# Patient Record
Sex: MALE | Race: WHITE | URBAN - METROPOLITAN AREA
[De-identification: names, ages, dates, MRNs, and addresses within clinical notes are randomized per-mention and may not be internally consistent; named-entity substitution may affect disease eponyms.]

---

## 2018-03-23 RX ADMIN — FENTANYL CITRATE 50 MICROGRAM(S): 50 INJECTION INTRAVENOUS at 16:40

## 2018-04-20 ENCOUNTER — INPATIENT (INPATIENT)
Facility: HOSPITAL | Age: 63
LOS: 7 days | Discharge: HOME | DRG: 236 | End: 2018-04-28
Attending: THORACIC SURGERY (CARDIOTHORACIC VASCULAR SURGERY) | Admitting: THORACIC SURGERY (CARDIOTHORACIC VASCULAR SURGERY)
Payer: COMMERCIAL

## 2018-04-20 VITALS
HEIGHT: 68 IN | SYSTOLIC BLOOD PRESSURE: 166 MMHG | HEART RATE: 87 BPM | DIASTOLIC BLOOD PRESSURE: 72 MMHG | WEIGHT: 164.46 LBS | OXYGEN SATURATION: 97 % | RESPIRATION RATE: 18 BRPM

## 2018-04-20 DIAGNOSIS — I25.10 ATHEROSCLEROTIC HEART DISEASE OF NATIVE CORONARY ARTERY WITHOUT ANGINA PECTORIS: ICD-10-CM

## 2018-04-20 DIAGNOSIS — Z90.89 ACQUIRED ABSENCE OF OTHER ORGANS: Chronic | ICD-10-CM

## 2018-04-20 DIAGNOSIS — I10 ESSENTIAL (PRIMARY) HYPERTENSION: ICD-10-CM

## 2018-04-20 DIAGNOSIS — K21.9 GASTRO-ESOPHAGEAL REFLUX DISEASE WITHOUT ESOPHAGITIS: ICD-10-CM

## 2018-04-20 DIAGNOSIS — I45.10 UNSPECIFIED RIGHT BUNDLE-BRANCH BLOCK: ICD-10-CM

## 2018-04-20 DIAGNOSIS — Z98.890 OTHER SPECIFIED POSTPROCEDURAL STATES: Chronic | ICD-10-CM

## 2018-04-20 DIAGNOSIS — I21.4 NON-ST ELEVATION (NSTEMI) MYOCARDIAL INFARCTION: ICD-10-CM

## 2018-04-20 DIAGNOSIS — E78.5 HYPERLIPIDEMIA, UNSPECIFIED: ICD-10-CM

## 2018-04-20 DIAGNOSIS — Z79.82 LONG TERM (CURRENT) USE OF ASPIRIN: ICD-10-CM

## 2018-04-20 PROBLEM — Z00.00 ENCOUNTER FOR PREVENTIVE HEALTH EXAMINATION: Status: ACTIVE | Noted: 2018-04-20

## 2018-04-20 LAB
ALBUMIN SERPL ELPH-MCNC: 3.9 G/DL — SIGNIFICANT CHANGE UP (ref 3.3–5)
ALP SERPL-CCNC: 61 U/L — SIGNIFICANT CHANGE UP (ref 40–120)
ALT FLD-CCNC: 26 U/L — SIGNIFICANT CHANGE UP (ref 10–45)
ANION GAP SERPL CALC-SCNC: 12 MMOL/L — SIGNIFICANT CHANGE UP (ref 5–17)
AST SERPL-CCNC: 30 U/L — SIGNIFICANT CHANGE UP (ref 10–40)
BILIRUB SERPL-MCNC: 0.3 MG/DL — SIGNIFICANT CHANGE UP (ref 0.2–1.2)
BLD GP AB SCN SERPL QL: NEGATIVE — SIGNIFICANT CHANGE UP
BLD GP AB SCN SERPL QL: NEGATIVE — SIGNIFICANT CHANGE UP
BUN SERPL-MCNC: 18 MG/DL — SIGNIFICANT CHANGE UP (ref 7–23)
CALCIUM SERPL-MCNC: 8.9 MG/DL — SIGNIFICANT CHANGE UP (ref 8.4–10.5)
CHLORIDE SERPL-SCNC: 101 MMOL/L — SIGNIFICANT CHANGE UP (ref 96–108)
CHOLEST SERPL-MCNC: 119 MG/DL — SIGNIFICANT CHANGE UP (ref 10–199)
CK MB CFR SERPL CALC: 5.4 NG/ML — SIGNIFICANT CHANGE UP (ref 0–6.7)
CK SERPL-CCNC: 250 U/L — HIGH (ref 30–200)
CO2 SERPL-SCNC: 24 MMOL/L — SIGNIFICANT CHANGE UP (ref 22–31)
CREAT SERPL-MCNC: 1.16 MG/DL — SIGNIFICANT CHANGE UP (ref 0.5–1.3)
GLUCOSE SERPL-MCNC: 96 MG/DL — SIGNIFICANT CHANGE UP (ref 70–99)
HBA1C BLD-MCNC: 4.8 % — SIGNIFICANT CHANGE UP (ref 4–5.6)
HCT VFR BLD CALC: 37.3 % — LOW (ref 39–50)
HDLC SERPL-MCNC: 26 MG/DL — LOW (ref 40–125)
HGB BLD-MCNC: 12.1 G/DL — LOW (ref 13–17)
LIPID PNL WITH DIRECT LDL SERPL: 61 MG/DL — SIGNIFICANT CHANGE UP
MAGNESIUM SERPL-MCNC: 2 MG/DL — SIGNIFICANT CHANGE UP (ref 1.6–2.6)
MCHC RBC-ENTMCNC: 29.4 PG — SIGNIFICANT CHANGE UP (ref 27–34)
MCHC RBC-ENTMCNC: 32.4 G/DL — SIGNIFICANT CHANGE UP (ref 32–36)
MCV RBC AUTO: 90.8 FL — SIGNIFICANT CHANGE UP (ref 80–100)
NT-PROBNP SERPL-SCNC: 245 PG/ML — SIGNIFICANT CHANGE UP (ref 0–300)
PHOSPHATE SERPL-MCNC: 3.9 MG/DL — SIGNIFICANT CHANGE UP (ref 2.5–4.5)
PLATELET # BLD AUTO: 215 K/UL — SIGNIFICANT CHANGE UP (ref 150–400)
POTASSIUM SERPL-MCNC: 4 MMOL/L — SIGNIFICANT CHANGE UP (ref 3.5–5.3)
POTASSIUM SERPL-SCNC: 4 MMOL/L — SIGNIFICANT CHANGE UP (ref 3.5–5.3)
PROT SERPL-MCNC: 7.4 G/DL — SIGNIFICANT CHANGE UP (ref 6–8.3)
RBC # BLD: 4.11 M/UL — LOW (ref 4.2–5.8)
RBC # FLD: 12.6 % — SIGNIFICANT CHANGE UP (ref 10.3–16.9)
RH IG SCN BLD-IMP: POSITIVE — SIGNIFICANT CHANGE UP
RH IG SCN BLD-IMP: POSITIVE — SIGNIFICANT CHANGE UP
SODIUM SERPL-SCNC: 137 MMOL/L — SIGNIFICANT CHANGE UP (ref 135–145)
TOTAL CHOLESTEROL/HDL RATIO MEASUREMENT: 4.6 RATIO — SIGNIFICANT CHANGE UP (ref 3.4–9.6)
TRIGL SERPL-MCNC: 159 MG/DL — HIGH (ref 10–149)
TROPONIN T SERPL-MCNC: 0.04 NG/ML — HIGH (ref 0–0.01)
WBC # BLD: 5.1 K/UL — SIGNIFICANT CHANGE UP (ref 3.8–10.5)
WBC # FLD AUTO: 5.1 K/UL — SIGNIFICANT CHANGE UP (ref 3.8–10.5)

## 2018-04-20 PROCEDURE — 99223 1ST HOSP IP/OBS HIGH 75: CPT

## 2018-04-20 PROCEDURE — 71045 X-RAY EXAM CHEST 1 VIEW: CPT | Mod: 26

## 2018-04-20 RX ORDER — SODIUM CHLORIDE 9 MG/ML
3 INJECTION INTRAMUSCULAR; INTRAVENOUS; SUBCUTANEOUS EVERY 8 HOURS
Qty: 0 | Refills: 0 | Status: DISCONTINUED | OUTPATIENT
Start: 2018-04-20 | End: 2018-04-23

## 2018-04-20 RX ORDER — ASPIRIN/CALCIUM CARB/MAGNESIUM 324 MG
81 TABLET ORAL DAILY
Qty: 0 | Refills: 0 | Status: DISCONTINUED | OUTPATIENT
Start: 2018-04-20 | End: 2018-04-23

## 2018-04-20 RX ORDER — PANTOPRAZOLE SODIUM 20 MG/1
40 TABLET, DELAYED RELEASE ORAL
Qty: 0 | Refills: 0 | Status: DISCONTINUED | OUTPATIENT
Start: 2018-04-20 | End: 2018-04-23

## 2018-04-20 RX ORDER — MULTIVIT-MIN/FERROUS GLUCONATE 9 MG/15 ML
1 LIQUID (ML) ORAL
Qty: 0 | Refills: 0 | COMMUNITY

## 2018-04-20 RX ORDER — METOPROLOL TARTRATE 50 MG
12.5 TABLET ORAL EVERY 12 HOURS
Qty: 0 | Refills: 0 | Status: DISCONTINUED | OUTPATIENT
Start: 2018-04-20 | End: 2018-04-22

## 2018-04-20 RX ORDER — ISOSORBIDE MONONITRATE 60 MG/1
30 TABLET, EXTENDED RELEASE ORAL DAILY
Qty: 0 | Refills: 0 | Status: DISCONTINUED | OUTPATIENT
Start: 2018-04-20 | End: 2018-04-23

## 2018-04-20 RX ORDER — ATORVASTATIN CALCIUM 80 MG/1
40 TABLET, FILM COATED ORAL AT BEDTIME
Qty: 0 | Refills: 0 | Status: DISCONTINUED | OUTPATIENT
Start: 2018-04-20 | End: 2018-04-23

## 2018-04-20 RX ORDER — HEPARIN SODIUM 5000 [USP'U]/ML
5000 INJECTION INTRAVENOUS; SUBCUTANEOUS EVERY 8 HOURS
Qty: 0 | Refills: 0 | Status: DISCONTINUED | OUTPATIENT
Start: 2018-04-20 | End: 2018-04-23

## 2018-04-20 RX ADMIN — SODIUM CHLORIDE 3 MILLILITER(S): 9 INJECTION INTRAMUSCULAR; INTRAVENOUS; SUBCUTANEOUS at 21:12

## 2018-04-20 NOTE — H&P ADULT - NSHPPHYSICALEXAM_GEN_ALL_CORE
Physical Exam  CONSTITUTIONAL: Lying in bed, comfortable, NAD  NEURO: A&O x 3, b/l upper and lower extremity strength 5/5.  Facial expressions symmetrical no focal deficits                 EYES: PERRL, b/l sclera anicteric   ENMT: oral mucosa moist   CV:  S1S2 RRR No M/G/R  RESPIRATORY:  CTA b/l No W/R/R    GI: soft NT/ND      : BUTT ( - )  MUSKULOSKELETAL: No muscular wasting      SKIN / BREAST:  No scars or rashes  VASCULAR: DP/PT pulses 2 + b/l.  Left radial pulse 2 + right radial cath site without obvious hematoma.

## 2018-04-20 NOTE — H&P ADULT - PSH
H/O colonoscopy    History of esophagogastroduodenoscopy (EGD)    S/P tonsillectomy and adenoidectomy

## 2018-04-20 NOTE — H&P ADULT - NSHPSOCIALHISTORY_GEN_ALL_CORE
Social History  Smoker:    NO        ETOH Use:   YES   Frequency / Quantity: 4 beers per night  Ilicit Drug Use:   NO  Occupation: Construction  Assistant Devices:   None  Lives with: Roommates, has multiple stairs at home

## 2018-04-20 NOTE — H&P ADULT - ASSESSMENT
61y/o male w PmHx HT, GERD, HLD, RBBB,  presented to [facility] [place] 4/19 on referral of PCP for abnormal EKG w recent h/o typical CP and elevated troponin level.  He was admitted to telemetry service r/o ACS.  Cardiac cath showed significant left main disease (85% stenosis). Pt was transferred to [facility] 4/20 for possible CABG.  On transfer patient is pain free and without acute complaints.    Neurovascular: no acute issues     Cardiovascular: Hemodynamically stable.   -monitor BP/HR/Telemetry monitoring  - EKG, TTE (complete)   - continue home medications including   - no BB 2/2 bradycardia  -Pre-op for possible CABG    Respiratory:   -Encourage C+DB and Use of IS 10x / hr while awake.  -Pre-op PFTs    GI: Stable.   -PPX with protonix   -PO Diet for now (DASH)    Renal / : monitor for baseline labs   -Monitor renal function.  -Monitor I/O's.      Endocrine:  monitor for baseline labs   -A1c.  -TSH.    Hematologic: monitor for baseline labs   -CBC.  -Coagulation Panel.    ID: Intact  Prophylaxis:  -DVT prophylaxis with 5000 SubQ Heparin q8h.  -SCD's 63y/o male w PmHx HT, GERD, HLD, RBBB,  presented to [facility] [place] 4/19 on referral of PCP for abnormal EKG w recent h/o typical CP and elevated troponin level.  He was admitted to telemetry service r/o ACS.  Cardiac cath showed significant left main disease (85% stenosis). Pt was transferred to [facility] 4/20 for possible CABG.  On transfer patient is pain free and without acute complaints.    Neurovascular: no acute issues     Cardiovascular: Hemodynamically stable.   -monitor BP/HR/Telemetry monitoring  - EKG, TTE (complete)   - continue home medications including   -Pre-op for possible CABG    Respiratory:   -Encourage C+DB and Use of IS 10x / hr while awake.  -Pre-op PFTs    GI: Stable.   -PPX with protonix   -PO Diet for now (DASH)    Renal / : monitor for baseline labs   -Monitor renal function.  -Monitor I/O's.      Endocrine:  monitor for baseline labs   -A1c.  -TSH.    Hematologic: monitor for baseline labs   -CBC.  -Coagulation Panel.    ID: Intact  Prophylaxis:  -DVT prophylaxis with 5000 SubQ Heparin q8h.  -SCD's

## 2018-04-20 NOTE — H&P ADULT - NSHPREVIEWOFSYSTEMS_GEN_ALL_CORE
Review of Systems  CONSTITUTIONAL:  Denies Fevers / chills, sweats, fatigue, weight loss, weight gain                                      NEURO:  Denies parathesias, seizures, syncope, confusion                                                                                EYES:  Denies Blurry vision, discharge, pain, loss of vision                                                                                    ENMT:  Denies Difficulty hearing, vertigo, dysphagia, epistaxis, recent dental work                                       CV:  Denies Chest pain, palpitations, BASS, orthopnea                                                                                          RESPIRATORY:  Denies Wheezing, SOB, cough / sputum, hemoptysis                                                                GI:  Denies Nausea, vommiting, diarrhea, constipation, melena, difficulty swallowing                                               : Denies Hematuria, dysuria, urgency, incontinence                                                                                         MUSKULOSKELETAL:  Denies arthritis, joint swelling, muscle weakness                                                             SKIN/BREAST:  Denies rash, itching, carlos loss, masses                                                                                            PSYCH:  Denies depresion, anxiety, suicidal ideation                                                                                               HEME/LYMPH:  Denies bruises easily, enlarged lymph nodes, tender lymph nodes                                        ENDOCRINE:  Denies cold intolerance, heat intolerance, polydipsia Review of Systems  CONSTITUTIONAL:  Denies Fevers / chills, sweats, fatigue, weight loss, weight gain                                      NEURO:  Denies parathesias, seizures, syncope, confusion                                                                                EYES:  Denies Blurry vision, discharge, pain, loss of vision                                                                                    ENMT:  Denies Difficulty hearing, vertigo, dysphagia, epistaxis, recent dental work                                       CV:  + chest pain Denies palpitations, BASS, orthopnea                                                                                        RESPIRATORY:  Denies Wheezing, SOB, cough / sputum, hemoptysis                                                                GI:  Denies Nausea, vommiting, diarrhea, constipation, melena, difficulty swallowing                                               : Denies Hematuria, dysuria, urgency, incontinence                                                                                         MUSKULOSKELETAL:  Denies arthritis, joint swelling, muscle weakness                                                             SKIN/BREAST:  Denies rash, itching, carlos loss, masses                                                                                            PSYCH:  Denies depresion, anxiety, suicidal ideation                                                                                               HEME/LYMPH:  Denies bruises easily, enlarged lymph nodes, tender lymph nodes                                        ENDOCRINE:  Denies cold intolerance, heat intolerance, polydipsia

## 2018-04-20 NOTE — H&P ADULT - HISTORY OF PRESENT ILLNESS
63y/o male w PmHx HT, GERD, HLD, RBBB,  [place] [facility] [place] referral of PCP for abnormal EKG w recent h/o typical CP and elevated troponin level.  He was admitted to telemetry service r/o ACS.  Cardiac cath showed significant left main disease (85% stenosis). Pt was transferred to [facility] 4/20 for possible CABG.  On transfer patient is pain free and without acute complaints. This is a 61y/o male w PmHx HT, GERD, HLD, RBBB,  presented to [facility] [place] 4/19 on referral of PCP for abnormal EKG w recent h/o chest pain.  Per patient he had on and off substernal chest pain while he was at work.  He took no medications to relieve his pain and went to his PCP the next day.  In PCP office he had an abnormal EKG and was subsequently reffered to [facility] [place].  In the ED patient had an elevated troponin and was admitted to telemetry unit to r/o ACS.  Cardiac cath performed revealing significant left main disease with 85% occlusion.  Patient denies any occurrence of chest pain since Tuesday.  He is seen and examined at the bedside and denies dizziness, near syncope, HA, CP, SOB, palpitations, N/V/C/D, leg swelling or calf pain.

## 2018-04-21 LAB
APPEARANCE UR: CLEAR — SIGNIFICANT CHANGE UP
BACTERIA # UR AUTO: PRESENT /HPF
BILIRUB UR-MCNC: NEGATIVE — SIGNIFICANT CHANGE UP
COLOR SPEC: YELLOW — SIGNIFICANT CHANGE UP
DIFF PNL FLD: (no result)
EPI CELLS # UR: SIGNIFICANT CHANGE UP /HPF (ref 0–5)
GLUCOSE UR QL: NEGATIVE — SIGNIFICANT CHANGE UP
KETONES UR-MCNC: NEGATIVE — SIGNIFICANT CHANGE UP
LEUKOCYTE ESTERASE UR-ACNC: NEGATIVE — SIGNIFICANT CHANGE UP
NITRITE UR-MCNC: NEGATIVE — SIGNIFICANT CHANGE UP
PH UR: 5.5 — SIGNIFICANT CHANGE UP (ref 5–8)
PROT UR-MCNC: NEGATIVE MG/DL — SIGNIFICANT CHANGE UP
RBC CASTS # UR COMP ASSIST: < 5 /HPF — SIGNIFICANT CHANGE UP
SP GR SPEC: 1.01 — SIGNIFICANT CHANGE UP (ref 1–1.03)
TSH SERPL-MCNC: 3.82 UIU/ML — SIGNIFICANT CHANGE UP (ref 0.35–4.94)
UROBILINOGEN FLD QL: 0.2 E.U./DL — SIGNIFICANT CHANGE UP
WBC UR QL: < 5 /HPF — SIGNIFICANT CHANGE UP

## 2018-04-21 PROCEDURE — 93306 TTE W/DOPPLER COMPLETE: CPT | Mod: 26

## 2018-04-21 PROCEDURE — 71046 X-RAY EXAM CHEST 2 VIEWS: CPT | Mod: 26

## 2018-04-21 PROCEDURE — 93880 EXTRACRANIAL BILAT STUDY: CPT | Mod: 26

## 2018-04-21 PROCEDURE — 94010 BREATHING CAPACITY TEST: CPT | Mod: 26

## 2018-04-21 RX ADMIN — HEPARIN SODIUM 5000 UNIT(S): 5000 INJECTION INTRAVENOUS; SUBCUTANEOUS at 06:12

## 2018-04-21 RX ADMIN — Medication 12.5 MILLIGRAM(S): at 00:35

## 2018-04-21 RX ADMIN — SODIUM CHLORIDE 3 MILLILITER(S): 9 INJECTION INTRAMUSCULAR; INTRAVENOUS; SUBCUTANEOUS at 06:07

## 2018-04-21 RX ADMIN — HEPARIN SODIUM 5000 UNIT(S): 5000 INJECTION INTRAVENOUS; SUBCUTANEOUS at 15:53

## 2018-04-21 RX ADMIN — ATORVASTATIN CALCIUM 40 MILLIGRAM(S): 80 TABLET, FILM COATED ORAL at 22:02

## 2018-04-21 RX ADMIN — SODIUM CHLORIDE 3 MILLILITER(S): 9 INJECTION INTRAMUSCULAR; INTRAVENOUS; SUBCUTANEOUS at 15:53

## 2018-04-21 RX ADMIN — HEPARIN SODIUM 5000 UNIT(S): 5000 INJECTION INTRAVENOUS; SUBCUTANEOUS at 22:02

## 2018-04-21 RX ADMIN — Medication 12.5 MILLIGRAM(S): at 18:05

## 2018-04-21 RX ADMIN — HEPARIN SODIUM 5000 UNIT(S): 5000 INJECTION INTRAVENOUS; SUBCUTANEOUS at 00:35

## 2018-04-21 RX ADMIN — Medication 81 MILLIGRAM(S): at 14:08

## 2018-04-21 RX ADMIN — ATORVASTATIN CALCIUM 40 MILLIGRAM(S): 80 TABLET, FILM COATED ORAL at 00:35

## 2018-04-21 RX ADMIN — ISOSORBIDE MONONITRATE 30 MILLIGRAM(S): 60 TABLET, EXTENDED RELEASE ORAL at 14:08

## 2018-04-21 RX ADMIN — SODIUM CHLORIDE 3 MILLILITER(S): 9 INJECTION INTRAMUSCULAR; INTRAVENOUS; SUBCUTANEOUS at 22:04

## 2018-04-21 RX ADMIN — PANTOPRAZOLE SODIUM 40 MILLIGRAM(S): 20 TABLET, DELAYED RELEASE ORAL at 06:12

## 2018-04-21 RX ADMIN — ISOSORBIDE MONONITRATE 30 MILLIGRAM(S): 60 TABLET, EXTENDED RELEASE ORAL at 00:35

## 2018-04-21 NOTE — PROGRESS NOTE ADULT - SUBJECTIVE AND OBJECTIVE BOX
Patient discussed on morning rounds with Dr. Solorio/castro    Operation / Date: preop     SUBJECTIVE ASSESSMENT:  62y Male seen at bedside. Patient with no acute complaints. No CP this AM         Vital Signs Last 24 Hrs  T(C): 36.6 (2018 08:45), Max: 37.3 (2018 22:22)  T(F): 97.8 (2018 08:45), Max: 99.1 (2018 22:22)  HR: 74 (2018 13:02) (60 - 87)  BP: 152/76 (2018 13:02) (107/56 - 166/72)  BP(mean): 93 (2018 13:02) (70 - 108)  RR: 15 (2018 13:02) (15 - 18)  SpO2: 98% (2018 13:02) (97% - 99%)  I&O's Detail    2018 07:01  -  2018 07:00  --------------------------------------------------------  IN:  Total IN: 0 mL    OUT:    Voided: 600 mL  Total OUT: 600 mL    Total NET: -600 mL      2018 07:01  -  2018 13:36  --------------------------------------------------------  IN:    Oral Fluid: 250 mL  Total IN: 250 mL    OUT:    Voided: 400 mL  Total OUT: 400 mL    Total NET: -150 mL          CHEST TUBE: No  WILMA DRAIN:  No.  EPICARDIAL WIRES: No.  TIE DOWNS: No.  BUTT: No.      CONSTITUTIONAL: Lying in bed, comfortable, NAD  NEURO: A&O x 3, b/l upper and lower extremity strength 5/5.  Facial expressions symmetrical no focal deficits                 EYES: PERRL, b/l sclera anicteric   ENMT: oral mucosa moist   CV:  S1S2 RRR No M/G/R  RESPIRATORY:  CTA b/l No W/R/R    GI: soft NT/ND      : BUTT ( - )  MUSKULOSKELETAL: No muscular wasting      SKIN / BREAST:  No scars or rashes  VASCULAR: DP/PT pulses 2 + b/l.  Left radial pulse 2 + right radial cath site without obvious hematoma.    LABS:                        12.1   5.1   )-----------( 215      ( 2018 22:41 )             37.3       COUMADIN:  Yes/No. REASON: .            137  |  101  |  18  ----------------------------<  96  4.0   |  24  |  1.16    Ca    8.9      2018 22:41  Phos  3.9     -20  Mg     2.0     -20    TPro  7.4  /  Alb  3.9  /  TBili  0.3  /  DBili  x   /  AST  30  /  ALT  26  /  AlkPhos  61  04-20      Urinalysis Basic - ( 2018 04:58 )    Color: Yellow / Appearance: Clear / S.015 / pH: x  Gluc: x / Ketone: NEGATIVE  / Bili: Negative / Urobili: 0.2 E.U./dL   Blood: x / Protein: NEGATIVE mg/dL / Nitrite: NEGATIVE   Leuk Esterase: NEGATIVE / RBC: < 5 /HPF / WBC < 5 /HPF   Sq Epi: x / Non Sq Epi: 0-5 /HPF / Bacteria: Present /HPF        MEDICATIONS  (STANDING):  aspirin enteric coated 81 milliGRAM(s) Oral daily  atorvastatin 40 milliGRAM(s) Oral at bedtime  heparin  Injectable 5000 Unit(s) SubCutaneous every 8 hours  isosorbide   mononitrate ER Tablet (IMDUR) 30 milliGRAM(s) Oral daily  metoprolol tartrate 12.5 milliGRAM(s) Oral every 12 hours  pantoprazole    Tablet 40 milliGRAM(s) Oral before breakfast  sodium chloride 0.9% lock flush 3 milliLiter(s) IV Push every 8 hours    MEDICATIONS  (PRN):        RADIOLOGY & ADDITIONAL TESTS:      This is a 61y/o male w PmHx HT, GERD, HLD, RBBB,  presented to [facility] [place]  on referral of PCP for abnormal EKG w recent h/o chest pain.  Per patient he had on and off substernal chest pain while he was at work.  He took no medications to relieve his pain and went to his PCP the next day.  In PCP office he had an abnormal EKG and was subsequently reffered to [facility] [place].  In the ED patient had an elevated troponin and was admitted to telemetry unit to r/o ACS.  Cardiac cath performed revealing significant left main disease with 85% occlusion.  Patient denies any occurrence of chest pain since Tuesday.  He is seen and examined at the bedside and denies dizziness, near syncope, HA, CP, SOB, palpitations, N/V/C/D, leg swelling or calf pain.      PHYSICAL EXAM:  Neurovascular: no acute issues     Cardiovascular: Hemodynamically stable.   -monitor BP/HR/Telemetry monitoring  - EKG, TTE (complete)   - continue home medications including   -Pre-op for possible CABG    Respiratory:   -Encourage C+DB and Use of IS 10x / hr while awake.  -Pre-op PFTs    GI: Stable.   -PPX with protonix   -PO Diet for now (DASH)    Renal / : monitor for baseline labs   -Monitor renal function.  -Monitor I/O's.      Endocrine:  monitor for baseline labs   -A1c.  -TSH.    Hematologic: monitor for baseline labs   -CBC.  -Coagulation Panel.    ID: Intact  Prophylaxis:  -DVT prophylaxis with 5000 SubQ Heparin q8h.  -SCD's Patient discussed on morning rounds with Dr. Solorio/castro    Operation / Date: preop     SUBJECTIVE ASSESSMENT:  62y Male seen at bedside. Patient with no acute complaints. No CP this AM         Vital Signs Last 24 Hrs  T(C): 36.6 (2018 08:45), Max: 37.3 (2018 22:22)  T(F): 97.8 (2018 08:45), Max: 99.1 (2018 22:22)  HR: 74 (2018 13:02) (60 - 87)  BP: 152/76 (2018 13:02) (107/56 - 166/72)  BP(mean): 93 (2018 13:02) (70 - 108)  RR: 15 (2018 13:02) (15 - 18)  SpO2: 98% (2018 13:02) (97% - 99%)  I&O's Detail    2018 07:01  -  2018 07:00  --------------------------------------------------------  IN:  Total IN: 0 mL    OUT:    Voided: 600 mL  Total OUT: 600 mL    Total NET: -600 mL      2018 07:01  -  2018 13:36  --------------------------------------------------------  IN:    Oral Fluid: 250 mL  Total IN: 250 mL    OUT:    Voided: 400 mL  Total OUT: 400 mL    Total NET: -150 mL          CHEST TUBE: No  WILMA DRAIN:  No.  EPICARDIAL WIRES: No.  TIE DOWNS: No.  BUTT: No.      CONSTITUTIONAL: Lying in bed, comfortable, NAD  NEURO: A&O x 3, b/l upper and lower extremity strength 5/5.  Facial expressions symmetrical no focal deficits                 EYES: PERRL, b/l sclera anicteric   ENMT: oral mucosa moist   CV:  S1S2 RRR No M/G/R  RESPIRATORY:  CTA b/l No W/R/R    GI: soft NT/ND      : BUTT ( - )  MUSKULOSKELETAL: No muscular wasting      SKIN / BREAST:  No scars or rashes  VASCULAR: DP/PT pulses 2 + b/l.  Left radial pulse 2 + right radial cath site without obvious hematoma.    LABS:                        12.1   5.1   )-----------( 215      ( 2018 22:41 )             37.3       COUMADIN:  No      -    137  |  101  |  18  ----------------------------<  96  4.0   |  24  |  1.16    Ca    8.9      2018 22:41  Phos  3.9     04-20  Mg     2.0     -20    TPro  7.4  /  Alb  3.9  /  TBili  0.3  /  DBili  x   /  AST  30  /  ALT  26  /  AlkPhos  61  04-20      Urinalysis Basic - ( 2018 04:58 )    Color: Yellow / Appearance: Clear / S.015 / pH: x  Gluc: x / Ketone: NEGATIVE  / Bili: Negative / Urobili: 0.2 E.U./dL   Blood: x / Protein: NEGATIVE mg/dL / Nitrite: NEGATIVE   Leuk Esterase: NEGATIVE / RBC: < 5 /HPF / WBC < 5 /HPF   Sq Epi: x / Non Sq Epi: 0-5 /HPF / Bacteria: Present /HPF        MEDICATIONS  (STANDING):  aspirin enteric coated 81 milliGRAM(s) Oral daily  atorvastatin 40 milliGRAM(s) Oral at bedtime  heparin  Injectable 5000 Unit(s) SubCutaneous every 8 hours  isosorbide   mononitrate ER Tablet (IMDUR) 30 milliGRAM(s) Oral daily  metoprolol tartrate 12.5 milliGRAM(s) Oral every 12 hours  pantoprazole    Tablet 40 milliGRAM(s) Oral before breakfast  sodium chloride 0.9% lock flush 3 milliLiter(s) IV Push every 8 hours    MEDICATIONS  (PRN):                This is a 61y/o male w PmHx HT, GERD, HLD, RBBB,  [place]ed to [facility] [place]  on referral of PCP for abnormal EKG w recent h/o chest pain.  Per patient he had on and off substernal chest pain while he was at work.  He took no medications to relieve his pain and went to his PCP the next day.  In PCP office he had an abnormal EKG and was subsequently reffered to [facility] [place].  In the ED patient had an elevated troponin and was admitted to telemetry unit to r/o ACS.  Cardiac cath performed revealing significant left main disease with 85% occlusion. Tx to [facility], preop for CABG    PHYSICAL EXAM:  Neurovascular: no acute issues   carotids performed, no HD signifact stenosis     Cardiovascular: Hemodynamically stable.   -monitor BP/HR/Telemetry monitoring  - EKG, TTE (complete)   - on BB, imdur, ASA. no acute CP since admission  - echo done on admission, EF normal, no valvular disease     Respiratory:   -Encourage C+DB and Use of IS 10x / hr while awake.  -Pre-op PFTs    GI: Stable.   -PPX with protonix   -PO Diet for now (DASH)    Renal / : monitor for baseline labs   -Monitor renal function.  -Monitor I/O's.      Endocrine:   -TSH, A1c normal     Hematologic:  - SCD/SQh for DVT ppx     Dispo:  plan for CABG monday

## 2018-04-22 LAB
ANION GAP SERPL CALC-SCNC: 12 MMOL/L — SIGNIFICANT CHANGE UP (ref 5–17)
ANION GAP SERPL CALC-SCNC: 9 MMOL/L — SIGNIFICANT CHANGE UP (ref 5–17)
BLD GP AB SCN SERPL QL: NEGATIVE — SIGNIFICANT CHANGE UP
BUN SERPL-MCNC: 18 MG/DL — SIGNIFICANT CHANGE UP (ref 7–23)
BUN SERPL-MCNC: 20 MG/DL — SIGNIFICANT CHANGE UP (ref 7–23)
CALCIUM SERPL-MCNC: 8.8 MG/DL — SIGNIFICANT CHANGE UP (ref 8.4–10.5)
CALCIUM SERPL-MCNC: 9 MG/DL — SIGNIFICANT CHANGE UP (ref 8.4–10.5)
CHLORIDE SERPL-SCNC: 102 MMOL/L — SIGNIFICANT CHANGE UP (ref 96–108)
CHLORIDE SERPL-SCNC: 103 MMOL/L — SIGNIFICANT CHANGE UP (ref 96–108)
CO2 SERPL-SCNC: 24 MMOL/L — SIGNIFICANT CHANGE UP (ref 22–31)
CO2 SERPL-SCNC: 28 MMOL/L — SIGNIFICANT CHANGE UP (ref 22–31)
CREAT SERPL-MCNC: 1.31 MG/DL — HIGH (ref 0.5–1.3)
CREAT SERPL-MCNC: 1.32 MG/DL — HIGH (ref 0.5–1.3)
GLUCOSE SERPL-MCNC: 108 MG/DL — HIGH (ref 70–99)
GLUCOSE SERPL-MCNC: 97 MG/DL — SIGNIFICANT CHANGE UP (ref 70–99)
HCT VFR BLD CALC: 35.8 % — LOW (ref 39–50)
HGB BLD-MCNC: 11.3 G/DL — LOW (ref 13–17)
MAGNESIUM SERPL-MCNC: 2 MG/DL — SIGNIFICANT CHANGE UP (ref 1.6–2.6)
MCHC RBC-ENTMCNC: 29.3 PG — SIGNIFICANT CHANGE UP (ref 27–34)
MCHC RBC-ENTMCNC: 31.6 G/DL — LOW (ref 32–36)
MCV RBC AUTO: 92.7 FL — SIGNIFICANT CHANGE UP (ref 80–100)
PLATELET # BLD AUTO: 184 K/UL — SIGNIFICANT CHANGE UP (ref 150–400)
POTASSIUM SERPL-MCNC: 4.1 MMOL/L — SIGNIFICANT CHANGE UP (ref 3.5–5.3)
POTASSIUM SERPL-MCNC: 4.5 MMOL/L — SIGNIFICANT CHANGE UP (ref 3.5–5.3)
POTASSIUM SERPL-SCNC: 4.1 MMOL/L — SIGNIFICANT CHANGE UP (ref 3.5–5.3)
POTASSIUM SERPL-SCNC: 4.5 MMOL/L — SIGNIFICANT CHANGE UP (ref 3.5–5.3)
RBC # BLD: 3.86 M/UL — LOW (ref 4.2–5.8)
RBC # FLD: 12.7 % — SIGNIFICANT CHANGE UP (ref 10.3–16.9)
RH IG SCN BLD-IMP: POSITIVE — SIGNIFICANT CHANGE UP
SODIUM SERPL-SCNC: 139 MMOL/L — SIGNIFICANT CHANGE UP (ref 135–145)
SODIUM SERPL-SCNC: 139 MMOL/L — SIGNIFICANT CHANGE UP (ref 135–145)
WBC # BLD: 5.8 K/UL — SIGNIFICANT CHANGE UP (ref 3.8–10.5)
WBC # FLD AUTO: 5.8 K/UL — SIGNIFICANT CHANGE UP (ref 3.8–10.5)

## 2018-04-22 RX ORDER — CHLORHEXIDINE GLUCONATE 213 G/1000ML
1 SOLUTION TOPICAL ONCE
Qty: 0 | Refills: 0 | Status: COMPLETED | OUTPATIENT
Start: 2018-04-22 | End: 2018-04-22

## 2018-04-22 RX ORDER — METOPROLOL TARTRATE 50 MG
12.5 TABLET ORAL EVERY 6 HOURS
Qty: 0 | Refills: 0 | Status: DISCONTINUED | OUTPATIENT
Start: 2018-04-22 | End: 2018-04-23

## 2018-04-22 RX ORDER — CHLORHEXIDINE GLUCONATE 213 G/1000ML
5 SOLUTION TOPICAL ONCE
Qty: 0 | Refills: 0 | Status: COMPLETED | OUTPATIENT
Start: 2018-04-22 | End: 2018-04-22

## 2018-04-22 RX ORDER — CHLORHEXIDINE GLUCONATE 213 G/1000ML
1 SOLUTION TOPICAL ONCE
Qty: 0 | Refills: 0 | Status: DISCONTINUED | OUTPATIENT
Start: 2018-04-22 | End: 2018-04-23

## 2018-04-22 RX ORDER — SODIUM CHLORIDE 9 MG/ML
1000 INJECTION, SOLUTION INTRAVENOUS
Qty: 0 | Refills: 0 | Status: DISCONTINUED | OUTPATIENT
Start: 2018-04-22 | End: 2018-04-23

## 2018-04-22 RX ADMIN — CHLORHEXIDINE GLUCONATE 1 APPLICATION(S): 213 SOLUTION TOPICAL at 22:35

## 2018-04-22 RX ADMIN — CHLORHEXIDINE GLUCONATE 5 MILLILITER(S): 213 SOLUTION TOPICAL at 17:53

## 2018-04-22 RX ADMIN — SODIUM CHLORIDE 3 MILLILITER(S): 9 INJECTION INTRAMUSCULAR; INTRAVENOUS; SUBCUTANEOUS at 06:27

## 2018-04-22 RX ADMIN — SODIUM CHLORIDE 75 MILLILITER(S): 9 INJECTION, SOLUTION INTRAVENOUS at 22:34

## 2018-04-22 RX ADMIN — HEPARIN SODIUM 5000 UNIT(S): 5000 INJECTION INTRAVENOUS; SUBCUTANEOUS at 06:27

## 2018-04-22 RX ADMIN — SODIUM CHLORIDE 3 MILLILITER(S): 9 INJECTION INTRAMUSCULAR; INTRAVENOUS; SUBCUTANEOUS at 23:22

## 2018-04-22 RX ADMIN — SODIUM CHLORIDE 3 MILLILITER(S): 9 INJECTION INTRAMUSCULAR; INTRAVENOUS; SUBCUTANEOUS at 17:53

## 2018-04-22 RX ADMIN — Medication 81 MILLIGRAM(S): at 12:26

## 2018-04-22 RX ADMIN — ISOSORBIDE MONONITRATE 30 MILLIGRAM(S): 60 TABLET, EXTENDED RELEASE ORAL at 12:26

## 2018-04-22 RX ADMIN — HEPARIN SODIUM 5000 UNIT(S): 5000 INJECTION INTRAVENOUS; SUBCUTANEOUS at 14:53

## 2018-04-22 RX ADMIN — ATORVASTATIN CALCIUM 40 MILLIGRAM(S): 80 TABLET, FILM COATED ORAL at 22:34

## 2018-04-22 RX ADMIN — PANTOPRAZOLE SODIUM 40 MILLIGRAM(S): 20 TABLET, DELAYED RELEASE ORAL at 06:27

## 2018-04-22 RX ADMIN — Medication 12.5 MILLIGRAM(S): at 17:54

## 2018-04-22 RX ADMIN — Medication 12.5 MILLIGRAM(S): at 23:22

## 2018-04-22 RX ADMIN — Medication 12.5 MILLIGRAM(S): at 06:26

## 2018-04-22 RX ADMIN — HEPARIN SODIUM 5000 UNIT(S): 5000 INJECTION INTRAVENOUS; SUBCUTANEOUS at 22:35

## 2018-04-22 RX ADMIN — Medication 12.5 MILLIGRAM(S): at 12:26

## 2018-04-22 NOTE — PROGRESS NOTE ADULT - SUBJECTIVE AND OBJECTIVE BOX
Planned Date of Surgery:  18                                                                                                                Surgeon: Dr. Solorio     Procedure: This is a 61y/o male w PmHx HT, GERD, HLD, RBBB,  [place]ed [place] [facility] [place]  [place] referral of PCP for abnormal EKG w recent h/o chest pain.  Per patient he had on and off substernal chest pain while he was at work.  He took no medications to relieve his pain and went to his PCP the next day.  In PCP office he had an abnormal EKG and was subsequently reffered to [facility] [place].  In the ED patient had an elevated troponin and was admitted to telemetry unit to r/o ACS.  Cardiac cath performed revealing significant left main disease with 85% occlusion.  Now admitted for CABG       HPI:  62y Male    PAST MEDICAL & SURGICAL HISTORY:  Hyperlipidemia, unspecified hyperlipidemia type  Gastroesophageal reflux disease, esophagitis presence not specified  Essential hypertension  H/O colonoscopy  History of esophagogastroduodenoscopy (EGD)  S/P tonsillectomy and adenoidectomy      No Known Drug Allergies  Prozac (Other)  shellfish (Rash)      MEDICATIONS  (STANDING):  aspirin enteric coated 81 milliGRAM(s) Oral daily  atorvastatin 40 milliGRAM(s) Oral at bedtime  heparin  Injectable 5000 Unit(s) SubCutaneous every 8 hours  isosorbide   mononitrate ER Tablet (IMDUR) 30 milliGRAM(s) Oral daily  metoprolol tartrate 12.5 milliGRAM(s) Oral every 6 hours  pantoprazole    Tablet 40 milliGRAM(s) Oral before breakfast  sodium chloride 0.9% lock flush 3 milliLiter(s) IV Push every 8 hours    MEDICATIONS  (PRN):      On Beta Blocker? YES     Labs:                        11.3   5.8   )-----------( 184      ( 2018 05:45 )             35.8     04-    139  |  103  |  18  ----------------------------<  108<H>  4.1   |  24  |  1.31<H>    Ca    8.8      2018 05:45  Phos  3.9     04-20  Mg     2.0     -    TPro  7.4  /  Alb  3.9  /  TBili  0.3  /  DBili  x   /  AST  30  /  ALT  26  /  AlkPhos  61  -20      Urinalysis Basic - ( 2018 04:58 )    Color: Yellow / Appearance: Clear / S.015 / pH: x  Gluc: x / Ketone: NEGATIVE  / Bili: Negative / Urobili: 0.2 E.U./dL   Blood: x / Protein: NEGATIVE mg/dL / Nitrite: NEGATIVE   Leuk Esterase: NEGATIVE / RBC: < 5 /HPF / WBC < 5 /HPF   Sq Epi: x / Non Sq Epi: 0-5 /HPF / Bacteria: Present /HPF      ABO Interpretation: O (18 @ 05:30)      CARDIAC MARKERS ( 2018 22:41 )  x     / 0.04 ng/mL / 250 U/L / x     / 5.4 ng/mL      Hgb A1C: 4.8    EKG: pending    CXR:   Frontal and lateral examination the chest demonstrates the heart to be   within normal limits in transverse diameter. Prominent bronchovascular   markings. Chronic interstitial changes. Patchy left basilar infiltrates   cannot be excluded. Degenerative changes thoracic spine.    Cath Report: see above     Echo:    The left atrial size is normal. Right atrial size is normal.There is mild   aortic valve thickening. The aortic valve is trileaflet. No aortic   regurgitation noted. No hemodynamically significant valvular aortic   stenosis. There is mild mitral valve thickening. There is trace mitral   regurgitation.Structurally normal tricuspid valve. There is trace   tricuspid regurgitation.There was insufficient TR detected from which to calculate   pulmonary artery systolic pressure.  The pulmonic valve is not well   visualized. No pulmonic regurgitation noted.Probably normal right   ventricular size and function.There is mild concentric left ventricular hypertrophy.   The left ventricular wall motion is normal. The left ventricular ejection   fraction is 74%. Normal LV diastolic function.  No aortic root dilatation.There   is no pericardial effusion    PFT's:    Carotid Duplex:  Small-to-moderate amount of plaque in the bilateral carotid arteries,   left greater than right. No hemodynamically significant carotid arterial   stenosis.    Consult in Chart?  YES   Consent in Chart? YES   Pre-op Orders Placed? YES   Blood Prodeucts Ordered? YES   NPO ordered? YES

## 2018-04-23 ENCOUNTER — APPOINTMENT (OUTPATIENT)
Dept: CARDIOTHORACIC SURGERY | Facility: HOSPITAL | Age: 63
End: 2018-04-23

## 2018-04-23 LAB
ALBUMIN SERPL ELPH-MCNC: 3.5 G/DL — SIGNIFICANT CHANGE UP (ref 3.3–5)
ALP SERPL-CCNC: 47 U/L — SIGNIFICANT CHANGE UP (ref 40–120)
ALT FLD-CCNC: 21 U/L — SIGNIFICANT CHANGE UP (ref 10–45)
ANION GAP SERPL CALC-SCNC: 11 MMOL/L — SIGNIFICANT CHANGE UP (ref 5–17)
ANION GAP SERPL CALC-SCNC: 12 MMOL/L — SIGNIFICANT CHANGE UP (ref 5–17)
ANION GAP SERPL CALC-SCNC: 13 MMOL/L — SIGNIFICANT CHANGE UP (ref 5–17)
ANION GAP SERPL CALC-SCNC: 9 MMOL/L — SIGNIFICANT CHANGE UP (ref 5–17)
APTT BLD: 26.5 SEC — LOW (ref 27.5–37.4)
APTT BLD: 29.5 SEC — SIGNIFICANT CHANGE UP (ref 27.5–37.4)
APTT BLD: 34.9 SEC — SIGNIFICANT CHANGE UP (ref 27.5–37.4)
AST SERPL-CCNC: 52 U/L — HIGH (ref 10–40)
BILIRUB SERPL-MCNC: 0.5 MG/DL — SIGNIFICANT CHANGE UP (ref 0.2–1.2)
BUN SERPL-MCNC: 14 MG/DL — SIGNIFICANT CHANGE UP (ref 7–23)
BUN SERPL-MCNC: 15 MG/DL — SIGNIFICANT CHANGE UP (ref 7–23)
BUN SERPL-MCNC: 15 MG/DL — SIGNIFICANT CHANGE UP (ref 7–23)
BUN SERPL-MCNC: 20 MG/DL — SIGNIFICANT CHANGE UP (ref 7–23)
CALCIUM SERPL-MCNC: 8.2 MG/DL — LOW (ref 8.4–10.5)
CALCIUM SERPL-MCNC: 8.3 MG/DL — LOW (ref 8.4–10.5)
CALCIUM SERPL-MCNC: 8.6 MG/DL — SIGNIFICANT CHANGE UP (ref 8.4–10.5)
CALCIUM SERPL-MCNC: 9.6 MG/DL — SIGNIFICANT CHANGE UP (ref 8.4–10.5)
CHLORIDE SERPL-SCNC: 102 MMOL/L — SIGNIFICANT CHANGE UP (ref 96–108)
CHLORIDE SERPL-SCNC: 104 MMOL/L — SIGNIFICANT CHANGE UP (ref 96–108)
CHLORIDE SERPL-SCNC: 104 MMOL/L — SIGNIFICANT CHANGE UP (ref 96–108)
CHLORIDE SERPL-SCNC: 106 MMOL/L — SIGNIFICANT CHANGE UP (ref 96–108)
CO2 SERPL-SCNC: 20 MMOL/L — LOW (ref 22–31)
CO2 SERPL-SCNC: 22 MMOL/L — SIGNIFICANT CHANGE UP (ref 22–31)
CO2 SERPL-SCNC: 23 MMOL/L — SIGNIFICANT CHANGE UP (ref 22–31)
CO2 SERPL-SCNC: 29 MMOL/L — SIGNIFICANT CHANGE UP (ref 22–31)
CREAT SERPL-MCNC: 0.97 MG/DL — SIGNIFICANT CHANGE UP (ref 0.5–1.3)
CREAT SERPL-MCNC: 0.98 MG/DL — SIGNIFICANT CHANGE UP (ref 0.5–1.3)
CREAT SERPL-MCNC: 1.02 MG/DL — SIGNIFICANT CHANGE UP (ref 0.5–1.3)
CREAT SERPL-MCNC: 1.25 MG/DL — SIGNIFICANT CHANGE UP (ref 0.5–1.3)
GAS PNL BLDA: SIGNIFICANT CHANGE UP
GLUCOSE BLDC GLUCOMTR-MCNC: 110 MG/DL — HIGH (ref 70–99)
GLUCOSE BLDC GLUCOMTR-MCNC: 130 MG/DL — HIGH (ref 70–99)
GLUCOSE BLDC GLUCOMTR-MCNC: 130 MG/DL — HIGH (ref 70–99)
GLUCOSE BLDC GLUCOMTR-MCNC: 147 MG/DL — HIGH (ref 70–99)
GLUCOSE BLDC GLUCOMTR-MCNC: 150 MG/DL — HIGH (ref 70–99)
GLUCOSE SERPL-MCNC: 107 MG/DL — HIGH (ref 70–99)
GLUCOSE SERPL-MCNC: 146 MG/DL — HIGH (ref 70–99)
GLUCOSE SERPL-MCNC: 158 MG/DL — HIGH (ref 70–99)
GLUCOSE SERPL-MCNC: 165 MG/DL — HIGH (ref 70–99)
HCT VFR BLD CALC: 32.3 % — LOW (ref 39–50)
HCT VFR BLD CALC: 33.7 % — LOW (ref 39–50)
HCT VFR BLD CALC: 35.5 % — LOW (ref 39–50)
HCT VFR BLD CALC: 39.1 % — SIGNIFICANT CHANGE UP (ref 39–50)
HGB BLD-MCNC: 10.6 G/DL — LOW (ref 13–17)
HGB BLD-MCNC: 10.9 G/DL — LOW (ref 13–17)
HGB BLD-MCNC: 11.9 G/DL — LOW (ref 13–17)
HGB BLD-MCNC: 12.6 G/DL — LOW (ref 13–17)
INR BLD: 1.2 — HIGH (ref 0.88–1.16)
INR BLD: 1.23 — HIGH (ref 0.88–1.16)
INR BLD: 1.3 — HIGH (ref 0.88–1.16)
LACTATE SERPL-SCNC: 1.2 MMOL/L — SIGNIFICANT CHANGE UP (ref 0.5–2)
LACTATE SERPL-SCNC: 2.1 MMOL/L — HIGH (ref 0.5–2)
LACTATE SERPL-SCNC: 2.5 MMOL/L — HIGH (ref 0.5–2)
LYMPHOCYTES # BLD AUTO: 5 % — LOW (ref 13–44)
MAGNESIUM SERPL-MCNC: 2 MG/DL — SIGNIFICANT CHANGE UP (ref 1.6–2.6)
MAGNESIUM SERPL-MCNC: 2.1 MG/DL — SIGNIFICANT CHANGE UP (ref 1.6–2.6)
MAGNESIUM SERPL-MCNC: 2.3 MG/DL — SIGNIFICANT CHANGE UP (ref 1.6–2.6)
MAGNESIUM SERPL-MCNC: 2.5 MG/DL — SIGNIFICANT CHANGE UP (ref 1.6–2.6)
MCHC RBC-ENTMCNC: 29.2 PG — SIGNIFICANT CHANGE UP (ref 27–34)
MCHC RBC-ENTMCNC: 29.2 PG — SIGNIFICANT CHANGE UP (ref 27–34)
MCHC RBC-ENTMCNC: 29.5 PG — SIGNIFICANT CHANGE UP (ref 27–34)
MCHC RBC-ENTMCNC: 29.6 PG — SIGNIFICANT CHANGE UP (ref 27–34)
MCHC RBC-ENTMCNC: 32.2 G/DL — SIGNIFICANT CHANGE UP (ref 32–36)
MCHC RBC-ENTMCNC: 32.3 G/DL — SIGNIFICANT CHANGE UP (ref 32–36)
MCHC RBC-ENTMCNC: 32.8 G/DL — SIGNIFICANT CHANGE UP (ref 32–36)
MCHC RBC-ENTMCNC: 33.5 G/DL — SIGNIFICANT CHANGE UP (ref 32–36)
MCV RBC AUTO: 88.1 FL — SIGNIFICANT CHANGE UP (ref 80–100)
MCV RBC AUTO: 89 FL — SIGNIFICANT CHANGE UP (ref 80–100)
MCV RBC AUTO: 90.3 FL — SIGNIFICANT CHANGE UP (ref 80–100)
MCV RBC AUTO: 91.8 FL — SIGNIFICANT CHANGE UP (ref 80–100)
MONOCYTES NFR BLD AUTO: 3 % — SIGNIFICANT CHANGE UP (ref 2–14)
NEUTROPHILS NFR BLD AUTO: 69 % — SIGNIFICANT CHANGE UP (ref 43–77)
PHOSPHATE SERPL-MCNC: 2.5 MG/DL — SIGNIFICANT CHANGE UP (ref 2.5–4.5)
PHOSPHATE SERPL-MCNC: 2.9 MG/DL — SIGNIFICANT CHANGE UP (ref 2.5–4.5)
PLATELET # BLD AUTO: 127 K/UL — LOW (ref 150–400)
PLATELET # BLD AUTO: 133 K/UL — LOW (ref 150–400)
PLATELET # BLD AUTO: 159 K/UL — SIGNIFICANT CHANGE UP (ref 150–400)
PLATELET # BLD AUTO: 207 K/UL — SIGNIFICANT CHANGE UP (ref 150–400)
POTASSIUM SERPL-MCNC: 4.2 MMOL/L — SIGNIFICANT CHANGE UP (ref 3.5–5.3)
POTASSIUM SERPL-MCNC: 4.6 MMOL/L — SIGNIFICANT CHANGE UP (ref 3.5–5.3)
POTASSIUM SERPL-MCNC: 4.7 MMOL/L — SIGNIFICANT CHANGE UP (ref 3.5–5.3)
POTASSIUM SERPL-MCNC: 4.7 MMOL/L — SIGNIFICANT CHANGE UP (ref 3.5–5.3)
POTASSIUM SERPL-SCNC: 4.2 MMOL/L — SIGNIFICANT CHANGE UP (ref 3.5–5.3)
POTASSIUM SERPL-SCNC: 4.6 MMOL/L — SIGNIFICANT CHANGE UP (ref 3.5–5.3)
POTASSIUM SERPL-SCNC: 4.7 MMOL/L — SIGNIFICANT CHANGE UP (ref 3.5–5.3)
POTASSIUM SERPL-SCNC: 4.7 MMOL/L — SIGNIFICANT CHANGE UP (ref 3.5–5.3)
PROT SERPL-MCNC: 6 G/DL — SIGNIFICANT CHANGE UP (ref 6–8.3)
PROTHROM AB SERPL-ACNC: 13.4 SEC — HIGH (ref 9.8–12.7)
PROTHROM AB SERPL-ACNC: 13.7 SEC — HIGH (ref 9.8–12.7)
PROTHROM AB SERPL-ACNC: 14.5 SEC — HIGH (ref 9.8–12.7)
RBC # BLD: 3.63 M/UL — LOW (ref 4.2–5.8)
RBC # BLD: 3.73 M/UL — LOW (ref 4.2–5.8)
RBC # BLD: 4.03 M/UL — LOW (ref 4.2–5.8)
RBC # BLD: 4.26 M/UL — SIGNIFICANT CHANGE UP (ref 4.2–5.8)
RBC # FLD: 12.5 % — SIGNIFICANT CHANGE UP (ref 10.3–16.9)
RBC # FLD: 12.6 % — SIGNIFICANT CHANGE UP (ref 10.3–16.9)
RBC # FLD: 12.7 % — SIGNIFICANT CHANGE UP (ref 10.3–16.9)
RBC # FLD: 12.8 % — SIGNIFICANT CHANGE UP (ref 10.3–16.9)
SODIUM SERPL-SCNC: 137 MMOL/L — SIGNIFICANT CHANGE UP (ref 135–145)
SODIUM SERPL-SCNC: 138 MMOL/L — SIGNIFICANT CHANGE UP (ref 135–145)
SODIUM SERPL-SCNC: 140 MMOL/L — SIGNIFICANT CHANGE UP (ref 135–145)
SODIUM SERPL-SCNC: 140 MMOL/L — SIGNIFICANT CHANGE UP (ref 135–145)
WBC # BLD: 4.4 K/UL — SIGNIFICANT CHANGE UP (ref 3.8–10.5)
WBC # BLD: 6.9 K/UL — SIGNIFICANT CHANGE UP (ref 3.8–10.5)
WBC # BLD: 7 K/UL — SIGNIFICANT CHANGE UP (ref 3.8–10.5)
WBC # BLD: 8.7 K/UL — SIGNIFICANT CHANGE UP (ref 3.8–10.5)
WBC # FLD AUTO: 4.4 K/UL — SIGNIFICANT CHANGE UP (ref 3.8–10.5)
WBC # FLD AUTO: 6.9 K/UL — SIGNIFICANT CHANGE UP (ref 3.8–10.5)
WBC # FLD AUTO: 7 K/UL — SIGNIFICANT CHANGE UP (ref 3.8–10.5)
WBC # FLD AUTO: 8.7 K/UL — SIGNIFICANT CHANGE UP (ref 3.8–10.5)

## 2018-04-23 PROCEDURE — 76937 US GUIDE VASCULAR ACCESS: CPT | Mod: 26,AS

## 2018-04-23 PROCEDURE — 93010 ELECTROCARDIOGRAM REPORT: CPT

## 2018-04-23 PROCEDURE — 33517 CABG ARTERY-VEIN SINGLE: CPT

## 2018-04-23 PROCEDURE — 33533 CABG ARTERIAL SINGLE: CPT

## 2018-04-23 PROCEDURE — 33517 CABG ARTERY-VEIN SINGLE: CPT | Mod: AS

## 2018-04-23 PROCEDURE — 99292 CRITICAL CARE ADDL 30 MIN: CPT

## 2018-04-23 PROCEDURE — 33508 ENDOSCOPIC VEIN HARVEST: CPT | Mod: AS

## 2018-04-23 PROCEDURE — 71045 X-RAY EXAM CHEST 1 VIEW: CPT | Mod: 26

## 2018-04-23 PROCEDURE — 99291 CRITICAL CARE FIRST HOUR: CPT

## 2018-04-23 PROCEDURE — 33533 CABG ARTERIAL SINGLE: CPT | Mod: AS

## 2018-04-23 RX ORDER — HEPARIN SODIUM 5000 [USP'U]/ML
5000 INJECTION INTRAVENOUS; SUBCUTANEOUS EVERY 8 HOURS
Qty: 0 | Refills: 0 | Status: DISCONTINUED | OUTPATIENT
Start: 2018-04-23 | End: 2018-04-28

## 2018-04-23 RX ORDER — CLOPIDOGREL BISULFATE 75 MG/1
75 TABLET, FILM COATED ORAL DAILY
Qty: 0 | Refills: 0 | Status: DISCONTINUED | OUTPATIENT
Start: 2018-04-23 | End: 2018-04-28

## 2018-04-23 RX ORDER — DEXTROSE 50 % IN WATER 50 %
1 SYRINGE (ML) INTRAVENOUS ONCE
Qty: 0 | Refills: 0 | Status: DISCONTINUED | OUTPATIENT
Start: 2018-04-23 | End: 2018-04-27

## 2018-04-23 RX ORDER — ATORVASTATIN CALCIUM 80 MG/1
40 TABLET, FILM COATED ORAL AT BEDTIME
Qty: 0 | Refills: 0 | Status: DISCONTINUED | OUTPATIENT
Start: 2018-04-23 | End: 2018-04-28

## 2018-04-23 RX ORDER — SODIUM CHLORIDE 9 MG/ML
1000 INJECTION INTRAMUSCULAR; INTRAVENOUS; SUBCUTANEOUS
Qty: 0 | Refills: 0 | Status: DISCONTINUED | OUTPATIENT
Start: 2018-04-23 | End: 2018-04-25

## 2018-04-23 RX ORDER — MAGNESIUM SULFATE 500 MG/ML
2 VIAL (ML) INJECTION ONCE
Qty: 0 | Refills: 0 | Status: COMPLETED | OUTPATIENT
Start: 2018-04-23 | End: 2018-04-23

## 2018-04-23 RX ORDER — ACETAMINOPHEN 500 MG
1000 TABLET ORAL ONCE
Qty: 0 | Refills: 0 | Status: COMPLETED | OUTPATIENT
Start: 2018-04-23 | End: 2018-04-23

## 2018-04-23 RX ORDER — LABETALOL HCL 100 MG
10 TABLET ORAL ONCE
Qty: 0 | Refills: 0 | Status: COMPLETED | OUTPATIENT
Start: 2018-04-23 | End: 2018-04-23

## 2018-04-23 RX ORDER — INSULIN LISPRO 100/ML
VIAL (ML) SUBCUTANEOUS
Qty: 0 | Refills: 0 | Status: DISCONTINUED | OUTPATIENT
Start: 2018-04-23 | End: 2018-04-27

## 2018-04-23 RX ORDER — PANTOPRAZOLE SODIUM 20 MG/1
40 TABLET, DELAYED RELEASE ORAL DAILY
Qty: 0 | Refills: 0 | Status: DISCONTINUED | OUTPATIENT
Start: 2018-04-23 | End: 2018-04-24

## 2018-04-23 RX ORDER — INSULIN HUMAN 100 [IU]/ML
2 INJECTION, SOLUTION SUBCUTANEOUS
Qty: 50 | Refills: 0 | Status: DISCONTINUED | OUTPATIENT
Start: 2018-04-23 | End: 2018-04-23

## 2018-04-23 RX ORDER — ALBUMIN HUMAN 25 %
250 VIAL (ML) INTRAVENOUS
Qty: 0 | Refills: 0 | Status: COMPLETED | OUTPATIENT
Start: 2018-04-23 | End: 2018-04-23

## 2018-04-23 RX ORDER — ASPIRIN/CALCIUM CARB/MAGNESIUM 324 MG
325 TABLET ORAL DAILY
Qty: 0 | Refills: 0 | Status: DISCONTINUED | OUTPATIENT
Start: 2018-04-23 | End: 2018-04-25

## 2018-04-23 RX ORDER — CEFAZOLIN SODIUM 1 G
2000 VIAL (EA) INJECTION EVERY 8 HOURS
Qty: 0 | Refills: 0 | Status: COMPLETED | OUTPATIENT
Start: 2018-04-23 | End: 2018-04-25

## 2018-04-23 RX ORDER — FENTANYL CITRATE 50 UG/ML
25 INJECTION INTRAVENOUS
Qty: 0 | Refills: 0 | Status: DISCONTINUED | OUTPATIENT
Start: 2018-04-23 | End: 2018-04-24

## 2018-04-23 RX ORDER — FENTANYL CITRATE 50 UG/ML
50 INJECTION INTRAVENOUS ONCE
Qty: 0 | Refills: 0 | Status: DISCONTINUED | OUTPATIENT
Start: 2018-04-23 | End: 2018-04-23

## 2018-04-23 RX ORDER — NICARDIPINE HYDROCHLORIDE 30 MG/1
2.5 CAPSULE, EXTENDED RELEASE ORAL
Qty: 40 | Refills: 0 | Status: DISCONTINUED | OUTPATIENT
Start: 2018-04-23 | End: 2018-04-24

## 2018-04-23 RX ORDER — DEXTROSE 50 % IN WATER 50 %
50 SYRINGE (ML) INTRAVENOUS
Qty: 0 | Refills: 0 | Status: DISCONTINUED | OUTPATIENT
Start: 2018-04-23 | End: 2018-04-25

## 2018-04-23 RX ORDER — GLUCAGON INJECTION, SOLUTION 0.5 MG/.1ML
1 INJECTION, SOLUTION SUBCUTANEOUS ONCE
Qty: 0 | Refills: 0 | Status: DISCONTINUED | OUTPATIENT
Start: 2018-04-23 | End: 2018-04-27

## 2018-04-23 RX ORDER — SODIUM CHLORIDE 9 MG/ML
1000 INJECTION, SOLUTION INTRAVENOUS
Qty: 0 | Refills: 0 | Status: DISCONTINUED | OUTPATIENT
Start: 2018-04-23 | End: 2018-04-27

## 2018-04-23 RX ORDER — SODIUM CHLORIDE 9 MG/ML
1000 INJECTION, SOLUTION INTRAVENOUS
Qty: 0 | Refills: 0 | Status: DISCONTINUED | OUTPATIENT
Start: 2018-04-23 | End: 2018-04-25

## 2018-04-23 RX ORDER — DEXMEDETOMIDINE HYDROCHLORIDE IN 0.9% SODIUM CHLORIDE 4 UG/ML
0.3 INJECTION INTRAVENOUS
Qty: 200 | Refills: 0 | Status: DISCONTINUED | OUTPATIENT
Start: 2018-04-23 | End: 2018-04-24

## 2018-04-23 RX ORDER — LIDOCAINE 4 G/100G
1 CREAM TOPICAL DAILY
Qty: 0 | Refills: 0 | Status: DISCONTINUED | OUTPATIENT
Start: 2018-04-23 | End: 2018-04-28

## 2018-04-23 RX ADMIN — Medication 125 MILLILITER(S): at 16:04

## 2018-04-23 RX ADMIN — FENTANYL CITRATE 25 MICROGRAM(S): 50 INJECTION INTRAVENOUS at 23:15

## 2018-04-23 RX ADMIN — Medication 400 MILLIGRAM(S): at 20:15

## 2018-04-23 RX ADMIN — FENTANYL CITRATE 25 MICROGRAM(S): 50 INJECTION INTRAVENOUS at 23:02

## 2018-04-23 RX ADMIN — SODIUM CHLORIDE 3 MILLILITER(S): 9 INJECTION INTRAMUSCULAR; INTRAVENOUS; SUBCUTANEOUS at 06:03

## 2018-04-23 RX ADMIN — LIDOCAINE 1 PATCH: 4 CREAM TOPICAL at 23:02

## 2018-04-23 RX ADMIN — Medication 100 MILLIGRAM(S): at 19:15

## 2018-04-23 RX ADMIN — Medication 1000 MILLIGRAM(S): at 20:30

## 2018-04-23 RX ADMIN — Medication 125 MILLILITER(S): at 16:06

## 2018-04-23 RX ADMIN — Medication 325 MILLIGRAM(S): at 23:02

## 2018-04-23 RX ADMIN — Medication 50 GRAM(S): at 16:06

## 2018-04-23 RX ADMIN — Medication 10 MILLIGRAM(S): at 16:07

## 2018-04-23 RX ADMIN — ATORVASTATIN CALCIUM 40 MILLIGRAM(S): 80 TABLET, FILM COATED ORAL at 23:02

## 2018-04-23 RX ADMIN — Medication 12.5 MILLIGRAM(S): at 06:16

## 2018-04-23 RX ADMIN — FENTANYL CITRATE 50 MICROGRAM(S): 50 INJECTION INTRAVENOUS at 16:04

## 2018-04-23 RX ADMIN — HEPARIN SODIUM 5000 UNIT(S): 5000 INJECTION INTRAVENOUS; SUBCUTANEOUS at 23:02

## 2018-04-23 NOTE — BRIEF OPERATIVE NOTE - PROCEDURE
<<-----Click on this checkbox to enter Procedure CABG (coronary artery bypass graft)  04/23/2018    Active  PMAINGON CABG (coronary artery bypass graft)  04/23/2018  LIMA to LAD, SVG to OM  Active  Lydia Silva

## 2018-04-23 NOTE — BRIEF OPERATIVE NOTE - POST-OP DX
Coronary artery disease involving native coronary artery of native heart, angina presence unspecified  04/23/2018    Active  Lydia Silva

## 2018-04-24 LAB
ALBUMIN SERPL ELPH-MCNC: 3.4 G/DL — SIGNIFICANT CHANGE UP (ref 3.3–5)
ALBUMIN SERPL ELPH-MCNC: 3.4 G/DL — SIGNIFICANT CHANGE UP (ref 3.3–5)
ALP SERPL-CCNC: 47 U/L — SIGNIFICANT CHANGE UP (ref 40–120)
ALP SERPL-CCNC: 50 U/L — SIGNIFICANT CHANGE UP (ref 40–120)
ALT FLD-CCNC: 19 U/L — SIGNIFICANT CHANGE UP (ref 10–45)
ALT FLD-CCNC: 20 U/L — SIGNIFICANT CHANGE UP (ref 10–45)
ANION GAP SERPL CALC-SCNC: 10 MMOL/L — SIGNIFICANT CHANGE UP (ref 5–17)
ANION GAP SERPL CALC-SCNC: 11 MMOL/L — SIGNIFICANT CHANGE UP (ref 5–17)
APTT BLD: 29.6 SEC — SIGNIFICANT CHANGE UP (ref 27.5–37.4)
AST SERPL-CCNC: 50 U/L — HIGH (ref 10–40)
AST SERPL-CCNC: 52 U/L — HIGH (ref 10–40)
BILIRUB SERPL-MCNC: 0.5 MG/DL — SIGNIFICANT CHANGE UP (ref 0.2–1.2)
BILIRUB SERPL-MCNC: 0.6 MG/DL — SIGNIFICANT CHANGE UP (ref 0.2–1.2)
BUN SERPL-MCNC: 14 MG/DL — SIGNIFICANT CHANGE UP (ref 7–23)
BUN SERPL-MCNC: 17 MG/DL — SIGNIFICANT CHANGE UP (ref 7–23)
CALCIUM SERPL-MCNC: 8.1 MG/DL — LOW (ref 8.4–10.5)
CALCIUM SERPL-MCNC: 8.4 MG/DL — SIGNIFICANT CHANGE UP (ref 8.4–10.5)
CHLORIDE SERPL-SCNC: 101 MMOL/L — SIGNIFICANT CHANGE UP (ref 96–108)
CHLORIDE SERPL-SCNC: 103 MMOL/L — SIGNIFICANT CHANGE UP (ref 96–108)
CO2 SERPL-SCNC: 23 MMOL/L — SIGNIFICANT CHANGE UP (ref 22–31)
CO2 SERPL-SCNC: 24 MMOL/L — SIGNIFICANT CHANGE UP (ref 22–31)
CREAT SERPL-MCNC: 0.99 MG/DL — SIGNIFICANT CHANGE UP (ref 0.5–1.3)
CREAT SERPL-MCNC: 1.13 MG/DL — SIGNIFICANT CHANGE UP (ref 0.5–1.3)
GAS PNL BLDA: SIGNIFICANT CHANGE UP
GLUCOSE BLDC GLUCOMTR-MCNC: 119 MG/DL — HIGH (ref 70–99)
GLUCOSE BLDC GLUCOMTR-MCNC: 131 MG/DL — HIGH (ref 70–99)
GLUCOSE BLDC GLUCOMTR-MCNC: 139 MG/DL — HIGH (ref 70–99)
GLUCOSE BLDC GLUCOMTR-MCNC: 167 MG/DL — HIGH (ref 70–99)
GLUCOSE SERPL-MCNC: 131 MG/DL — HIGH (ref 70–99)
GLUCOSE SERPL-MCNC: 145 MG/DL — HIGH (ref 70–99)
HCT VFR BLD CALC: 33.3 % — LOW (ref 39–50)
HCT VFR BLD CALC: 33.4 % — LOW (ref 39–50)
HGB BLD-MCNC: 10.5 G/DL — LOW (ref 13–17)
HGB BLD-MCNC: 11 G/DL — LOW (ref 13–17)
INR BLD: 1.25 — HIGH (ref 0.88–1.16)
INR BLD: 1.3 — HIGH (ref 0.88–1.16)
LACTATE SERPL-SCNC: 1 MMOL/L — SIGNIFICANT CHANGE UP (ref 0.5–2)
MAGNESIUM SERPL-MCNC: 2.2 MG/DL — SIGNIFICANT CHANGE UP (ref 1.6–2.6)
MAGNESIUM SERPL-MCNC: 2.3 MG/DL — SIGNIFICANT CHANGE UP (ref 1.6–2.6)
MCHC RBC-ENTMCNC: 29.2 PG — SIGNIFICANT CHANGE UP (ref 27–34)
MCHC RBC-ENTMCNC: 30.1 PG — SIGNIFICANT CHANGE UP (ref 27–34)
MCHC RBC-ENTMCNC: 31.4 G/DL — LOW (ref 32–36)
MCHC RBC-ENTMCNC: 33 G/DL — SIGNIFICANT CHANGE UP (ref 32–36)
MCV RBC AUTO: 91.2 FL — SIGNIFICANT CHANGE UP (ref 80–100)
MCV RBC AUTO: 92.8 FL — SIGNIFICANT CHANGE UP (ref 80–100)
PHOSPHATE SERPL-MCNC: 2.9 MG/DL — SIGNIFICANT CHANGE UP (ref 2.5–4.5)
PLATELET # BLD AUTO: 151 K/UL — SIGNIFICANT CHANGE UP (ref 150–400)
PLATELET # BLD AUTO: 157 K/UL — SIGNIFICANT CHANGE UP (ref 150–400)
POTASSIUM SERPL-MCNC: 4.4 MMOL/L — SIGNIFICANT CHANGE UP (ref 3.5–5.3)
POTASSIUM SERPL-MCNC: 4.5 MMOL/L — SIGNIFICANT CHANGE UP (ref 3.5–5.3)
POTASSIUM SERPL-SCNC: 4.4 MMOL/L — SIGNIFICANT CHANGE UP (ref 3.5–5.3)
POTASSIUM SERPL-SCNC: 4.5 MMOL/L — SIGNIFICANT CHANGE UP (ref 3.5–5.3)
PROT SERPL-MCNC: 6.2 G/DL — SIGNIFICANT CHANGE UP (ref 6–8.3)
PROT SERPL-MCNC: 6.6 G/DL — SIGNIFICANT CHANGE UP (ref 6–8.3)
PROTHROM AB SERPL-ACNC: 13.9 SEC — HIGH (ref 9.8–12.7)
PROTHROM AB SERPL-ACNC: 14.5 SEC — HIGH (ref 9.8–12.7)
RBC # BLD: 3.6 M/UL — LOW (ref 4.2–5.8)
RBC # BLD: 3.65 M/UL — LOW (ref 4.2–5.8)
RBC # FLD: 12.8 % — SIGNIFICANT CHANGE UP (ref 10.3–16.9)
RBC # FLD: 13.1 % — SIGNIFICANT CHANGE UP (ref 10.3–16.9)
SODIUM SERPL-SCNC: 135 MMOL/L — SIGNIFICANT CHANGE UP (ref 135–145)
SODIUM SERPL-SCNC: 137 MMOL/L — SIGNIFICANT CHANGE UP (ref 135–145)
WBC # BLD: 8.5 K/UL — SIGNIFICANT CHANGE UP (ref 3.8–10.5)
WBC # BLD: 9.7 K/UL — SIGNIFICANT CHANGE UP (ref 3.8–10.5)
WBC # FLD AUTO: 8.5 K/UL — SIGNIFICANT CHANGE UP (ref 3.8–10.5)
WBC # FLD AUTO: 9.7 K/UL — SIGNIFICANT CHANGE UP (ref 3.8–10.5)

## 2018-04-24 PROCEDURE — 71045 X-RAY EXAM CHEST 1 VIEW: CPT | Mod: 26

## 2018-04-24 RX ORDER — OXYCODONE AND ACETAMINOPHEN 5; 325 MG/1; MG/1
2 TABLET ORAL EVERY 6 HOURS
Qty: 0 | Refills: 0 | Status: DISCONTINUED | OUTPATIENT
Start: 2018-04-24 | End: 2018-04-28

## 2018-04-24 RX ORDER — KETOROLAC TROMETHAMINE 30 MG/ML
30 SYRINGE (ML) INJECTION ONCE
Qty: 0 | Refills: 0 | Status: DISCONTINUED | OUTPATIENT
Start: 2018-04-24 | End: 2018-04-24

## 2018-04-24 RX ORDER — SENNA PLUS 8.6 MG/1
2 TABLET ORAL AT BEDTIME
Qty: 0 | Refills: 0 | Status: DISCONTINUED | OUTPATIENT
Start: 2018-04-24 | End: 2018-04-28

## 2018-04-24 RX ORDER — METOPROLOL TARTRATE 50 MG
12.5 TABLET ORAL
Qty: 0 | Refills: 0 | Status: DISCONTINUED | OUTPATIENT
Start: 2018-04-24 | End: 2018-04-24

## 2018-04-24 RX ORDER — METOPROLOL TARTRATE 50 MG
25 TABLET ORAL EVERY 8 HOURS
Qty: 0 | Refills: 0 | Status: DISCONTINUED | OUTPATIENT
Start: 2018-04-24 | End: 2018-04-28

## 2018-04-24 RX ORDER — POLYETHYLENE GLYCOL 3350 17 G/17G
17 POWDER, FOR SOLUTION ORAL DAILY
Qty: 0 | Refills: 0 | Status: DISCONTINUED | OUTPATIENT
Start: 2018-04-24 | End: 2018-04-28

## 2018-04-24 RX ORDER — DOCUSATE SODIUM 100 MG
100 CAPSULE ORAL THREE TIMES A DAY
Qty: 0 | Refills: 0 | Status: DISCONTINUED | OUTPATIENT
Start: 2018-04-24 | End: 2018-04-28

## 2018-04-24 RX ORDER — KETOROLAC TROMETHAMINE 30 MG/ML
15 SYRINGE (ML) INJECTION ONCE
Qty: 0 | Refills: 0 | Status: DISCONTINUED | OUTPATIENT
Start: 2018-04-24 | End: 2018-04-24

## 2018-04-24 RX ORDER — FAMOTIDINE 10 MG/ML
20 INJECTION INTRAVENOUS
Qty: 0 | Refills: 0 | Status: DISCONTINUED | OUTPATIENT
Start: 2018-04-24 | End: 2018-04-28

## 2018-04-24 RX ORDER — ACETAMINOPHEN 500 MG
650 TABLET ORAL EVERY 6 HOURS
Qty: 0 | Refills: 0 | Status: DISCONTINUED | OUTPATIENT
Start: 2018-04-24 | End: 2018-04-28

## 2018-04-24 RX ORDER — OXYCODONE AND ACETAMINOPHEN 5; 325 MG/1; MG/1
1 TABLET ORAL EVERY 6 HOURS
Qty: 0 | Refills: 0 | Status: DISCONTINUED | OUTPATIENT
Start: 2018-04-24 | End: 2018-04-28

## 2018-04-24 RX ADMIN — HEPARIN SODIUM 5000 UNIT(S): 5000 INJECTION INTRAVENOUS; SUBCUTANEOUS at 13:50

## 2018-04-24 RX ADMIN — OXYCODONE AND ACETAMINOPHEN 1 TABLET(S): 5; 325 TABLET ORAL at 20:45

## 2018-04-24 RX ADMIN — ATORVASTATIN CALCIUM 40 MILLIGRAM(S): 80 TABLET, FILM COATED ORAL at 21:59

## 2018-04-24 RX ADMIN — OXYCODONE AND ACETAMINOPHEN 1 TABLET(S): 5; 325 TABLET ORAL at 15:54

## 2018-04-24 RX ADMIN — OXYCODONE AND ACETAMINOPHEN 1 TABLET(S): 5; 325 TABLET ORAL at 10:51

## 2018-04-24 RX ADMIN — Medication 100 MILLIGRAM(S): at 01:33

## 2018-04-24 RX ADMIN — Medication 100 MILLIGRAM(S): at 13:50

## 2018-04-24 RX ADMIN — OXYCODONE AND ACETAMINOPHEN 1 TABLET(S): 5; 325 TABLET ORAL at 16:54

## 2018-04-24 RX ADMIN — LIDOCAINE 1 PATCH: 4 CREAM TOPICAL at 10:59

## 2018-04-24 RX ADMIN — FAMOTIDINE 20 MILLIGRAM(S): 10 INJECTION INTRAVENOUS at 18:20

## 2018-04-24 RX ADMIN — FENTANYL CITRATE 25 MICROGRAM(S): 50 INJECTION INTRAVENOUS at 02:59

## 2018-04-24 RX ADMIN — Medication 25 MILLIGRAM(S): at 16:40

## 2018-04-24 RX ADMIN — LIDOCAINE 1 PATCH: 4 CREAM TOPICAL at 11:24

## 2018-04-24 RX ADMIN — Medication 100 MILLIGRAM(S): at 21:59

## 2018-04-24 RX ADMIN — Medication 100 MILLIGRAM(S): at 09:10

## 2018-04-24 RX ADMIN — Medication 1 TABLET(S): at 11:16

## 2018-04-24 RX ADMIN — SENNA PLUS 2 TABLET(S): 8.6 TABLET ORAL at 21:59

## 2018-04-24 RX ADMIN — Medication 30 MILLIGRAM(S): at 18:36

## 2018-04-24 RX ADMIN — OXYCODONE AND ACETAMINOPHEN 2 TABLET(S): 5; 325 TABLET ORAL at 21:15

## 2018-04-24 RX ADMIN — CLOPIDOGREL BISULFATE 75 MILLIGRAM(S): 75 TABLET, FILM COATED ORAL at 11:16

## 2018-04-24 RX ADMIN — OXYCODONE AND ACETAMINOPHEN 1 TABLET(S): 5; 325 TABLET ORAL at 09:51

## 2018-04-24 RX ADMIN — Medication 325 MILLIGRAM(S): at 11:16

## 2018-04-24 RX ADMIN — Medication 25 MILLIGRAM(S): at 09:10

## 2018-04-24 RX ADMIN — Medication 15 MILLIGRAM(S): at 05:00

## 2018-04-24 RX ADMIN — LIDOCAINE 1 PATCH: 4 CREAM TOPICAL at 22:01

## 2018-04-24 RX ADMIN — HEPARIN SODIUM 5000 UNIT(S): 5000 INJECTION INTRAVENOUS; SUBCUTANEOUS at 21:59

## 2018-04-24 RX ADMIN — HEPARIN SODIUM 5000 UNIT(S): 5000 INJECTION INTRAVENOUS; SUBCUTANEOUS at 06:25

## 2018-04-24 RX ADMIN — FENTANYL CITRATE 25 MICROGRAM(S): 50 INJECTION INTRAVENOUS at 02:34

## 2018-04-24 RX ADMIN — Medication 100 MILLIGRAM(S): at 16:37

## 2018-04-24 RX ADMIN — Medication 1: at 22:07

## 2018-04-24 RX ADMIN — Medication 30 MILLIGRAM(S): at 18:21

## 2018-04-24 NOTE — PHYSICAL THERAPY INITIAL EVALUATION ADULT - GENERAL OBSERVATIONS, REHAB EVAL
patient received seated out of bed with no acute distress. +EKG, +gillian, +central line, +chest tube to wall suction, +gosia to wall suction x 2, +webster, +NC 4L./min, +SCDs

## 2018-04-24 NOTE — PROGRESS NOTE ADULT - SUBJECTIVE AND OBJECTIVE BOX
PMH :  CAD  No h/o HF  No pertinent family history in first degree relatives  Handoff  MEWS Score  Hyperlipidemia, unspecified hyperlipidemia type  Gastroesophageal reflux disease, esophagitis presence not specified  Essential hypertension  Coronary artery disease involving native coronary artery of native heart, angina presence unspecified  Coronary artery disease involving native coronary artery of native heart, angina presence unspecified  CABG (coronary artery bypass graft)  H/O colonoscopy  History of esophagogastroduodenoscopy (EGD)  S/P tonsillectomy and adenoidectomy    ROS  All other systems reviewed and negative.    ICU Vital Signs Last 24 Hrs  T(C): 37.1 (04-24-18 @ 05:00), Max: 37.3 (04-24-18 @ 01:00)  T(F): 98.8 (04-24-18 @ 05:00), Max: 99.1 (04-24-18 @ 01:00)  HR: 76 (04-24-18 @ 08:01) (72 - 84)  BP: 135/61 (04-23-18 @ 14:45) (135/61 - 153/68)  BP(mean): 87 (04-23-18 @ 14:45) (87 - 97)  ABP: 132/52 (04-24-18 @ 08:01) (122/48 - 168/68)  ABP(mean): 72 (04-24-18 @ 08:01) (68 - 104)  RR: 20 (04-24-18 @ 08:01) (0 - 29)  SpO2: 100% (04-24-18 @ 08:01) (95% - 100%)    I&O's Summary    23 Apr 2018 07:01  -  24 Apr 2018 07:00  --------------------------------------------------------  IN: 2685.6 mL / OUT: 3625 mL / NET: -939.4 mL    24 Apr 2018 07:01  -  24 Apr 2018 09:01  --------------------------------------------------------  IN: 10 mL / OUT: 40 mL / NET: -30 mL      Mode: standby    ABG - ( 23 Apr 2018 22:28 )  pH: 7.40  /  pCO2: 37    /  pO2: 198   / HCO3: 22    / Base Excess: -2.4  /  SaO2: 99                            11.0   8.5   )-----------( 151      ( 24 Apr 2018 03:07 )             33.3     24 Apr 2018 03:07    137    |  103    |  14     ----------------------------<  145    4.4     |  23     |  0.99     Ca    8.1        24 Apr 2018 03:07  Phos  2.9       23 Apr 2018 16:52  Mg     2.2       24 Apr 2018 03:07    TPro  6.2    /  Alb  3.4    /  TBili  0.5    /  DBili  x      /  AST  50     /  ALT  19     /  AlkPhos  47     24 Apr 2018 03:07    PT/INR - ( 24 Apr 2018 03:07 )   PT: 13.9 sec;   INR: 1.25          PTT - ( 24 Apr 2018 03:07 )  PTT:29.6 sec  MEDICATIONS  (STANDING):  aspirin enteric coated 325 milliGRAM(s) Oral daily  atorvastatin 40 milliGRAM(s) Oral at bedtime  ceFAZolin   IVPB 2000 milliGRAM(s) IV Intermittent every 8 hours  clopidogrel Tablet 75 milliGRAM(s) Oral daily  dexmedetomidine Infusion 0.3 MICROgram(s)/kG/Hr IV Continuous <Continuous>  heparin  Injectable 5000 Unit(s) SubCutaneous every 8 hours  insulin lispro (HumaLOG) corrective regimen sliding scale   SubCutaneous Before meals and at bedtime  lactated ringers. 1000 milliLiter(s) IV Continuous <Continuous>  lidocaine   Patch 1 Patch Transdermal daily  metoprolol tartrate 25 milliGRAM(s) Oral every 8 hours  multivitamin 1 Tablet(s) Oral daily  niCARdipine Infusion 2.5 mG/Hr IV Continuous <Continuous>  pantoprazole  Injectable 40 milliGRAM(s) IV Push daily      Home Medications:  Aspir 81 oral delayed release tablet: 1 tab(s) orally once a day (20 Apr 2018 22:26)  Centrum Silver Men&#x27;s oral tablet: 1 tab(s) orally once a day (20 Apr 2018 22:26)  Lipitor 40 mg oral tablet: 1 tab(s) orally once a day (20 Apr 2018 22:26)  lisinopril 20 mg oral tablet: 1 tab(s) orally once a day (20 Apr 2018 22:26)  Toprol-XL 25 mg oral tablet, extended release: 1 tab(s) orally once a day (20 Apr 2018 22:26)    PHYSICAL EXAM:  Gen : no acute distress  Neck: No LAD, No JVD  Respiratory: decreased in the bases  Cardiovascular: S1 and S2, RRR, no M/G/R  Gastrointestinal: BS+, soft, NT/ND  Extremities: No peripheral edema  Vascular: 2+ peripheral pulses  Neurological: A/O x 3, no focal deficits  Incision: clean dry/ no sign of infection  Lines: no sign of infection POD #1 s/p CABG X2 EF normal     Doing well.  routine post op issues.     PMH :  CAD  Hyperlipidemia, unspecified hyperlipidemia type  Gastroesophageal reflux disease, esophagitis presence not specified  Essential hypertension  H/O colonoscopy  History of esophagogastroduodenoscopy (EGD)  S/P tonsillectomy and adenoidectomy    ROS incisional pain   All other systems reviewed and negative.    ICU Vital Signs Last 24 Hrs  T(C): 37.1 (04-24-18 @ 05:00), Max: 37.3 (04-24-18 @ 01:00)  T(F): 98.8 (04-24-18 @ 05:00), Max: 99.1 (04-24-18 @ 01:00)  HR: 76 (04-24-18 @ 08:01) (72 - 84)  BP: 135/61 (04-23-18 @ 14:45) (135/61 - 153/68)  BP(mean): 87 (04-23-18 @ 14:45) (87 - 97)  ABP: 132/52 (04-24-18 @ 08:01) (122/48 - 168/68)  ABP(mean): 72 (04-24-18 @ 08:01) (68 - 104)  RR: 20 (04-24-18 @ 08:01) (0 - 29)  SpO2: 100% (04-24-18 @ 08:01) (95% - 100%)    I&O's Summary    23 Apr 2018 07:01  -  24 Apr 2018 07:00  --------------------------------------------------------  IN: 2685.6 mL / OUT: 3625 mL / NET: -939.4 mL    24 Apr 2018 07:01  -  24 Apr 2018 09:01  --------------------------------------------------------  IN: 10 mL / OUT: 40 mL / NET: -30 mL      Mode: standby    ABG - ( 23 Apr 2018 22:28 )  pH: 7.40  /  pCO2: 37    /  pO2: 198   / HCO3: 22    / Base Excess: -2.4  /  SaO2: 99                            11.0   8.5   )-----------( 151      ( 24 Apr 2018 03:07 )             33.3     24 Apr 2018 03:07    137    |  103    |  14     ----------------------------<  145    4.4     |  23     |  0.99     Ca    8.1        24 Apr 2018 03:07  Phos  2.9       23 Apr 2018 16:52  Mg     2.2       24 Apr 2018 03:07    TPro  6.2    /  Alb  3.4    /  TBili  0.5    /  DBili  x      /  AST  50     /  ALT  19     /  AlkPhos  47     24 Apr 2018 03:07    PT/INR - ( 24 Apr 2018 03:07 )   PT: 13.9 sec;   INR: 1.25      PTT - ( 24 Apr 2018 03:07 )  PTT:29.6 sec    MEDICATIONS  (STANDING):  aspirin enteric coated 325 milliGRAM(s) Oral daily  atorvastatin 40 milliGRAM(s) Oral at bedtime  ceFAZolin   IVPB 2000 milliGRAM(s) IV Intermittent every 8 hours  clopidogrel Tablet 75 milliGRAM(s) Oral daily  heparin  Injectable 5000 Unit(s) SubCutaneous every 8 hours  insulin lispro (HumaLOG) corrective regimen sliding scale   SubCutaneous Before meals and at bedtime  lactated ringers. 1000 milliLiter(s) IV Continuous <Continuous>  lidocaine   Patch 1 Patch Transdermal daily  metoprolol tartrate 25 milliGRAM(s) Oral every 8 hours  multivitamin 1 Tablet(s) Oral daily  niCARdipine Infusion 2.5 mG/Hr IV Continuous <Continuous>  pantoprazole  Injectable 40 milliGRAM(s) IV Push daily      Home Medications:  Aspir 81 oral delayed release tablet: 1 tab(s) orally once a day (20 Apr 2018 22:26)  Centrum Silver Men&#x27;s oral tablet: 1 tab(s) orally once a day (20 Apr 2018 22:26)  Lipitor 40 mg oral tablet: 1 tab(s) orally once a day (20 Apr 2018 22:26)  lisinopril 20 mg oral tablet: 1 tab(s) orally once a day (20 Apr 2018 22:26)  Toprol-XL 25 mg oral tablet, extended release: 1 tab(s) orally once a day (20 Apr 2018 22:26)    PHYSICAL EXAM:  Gen : no acute distress  Neck: No LAD, No JVD  Respiratory: decreased in the bases  Cardiovascular: S1 and S2, RRR, no M/G/R  Gastrointestinal: BS+, soft, NT/ND  Extremities: No peripheral edema  Vascular: 2+ peripheral pulses  Neurological: A/O x 3, no focal deficits  Incision: clean dry/ no sign of infection  Lines: no sign of infection

## 2018-04-24 NOTE — PROGRESS NOTE ADULT - ASSESSMENT
61y/o male w PmHx HT, GERD, HLD, RBBB,  admitted to [facility] [place] 4/19 for w/u chest pain. LC showed 85% LM disease, pt now s/p CABG X2.    Pt doing well.     Problems  1. s/p CABG  2. Hemodynamic support    Plan   Neuro -- pain control  CVS - hemodynamic support, monitor for arrhythmia.    Supported with Nicardipene overnight.  Pt transitioned to Metop  ASA, Plavix  Pulm - IS, ambulation  GI - GI proph, diet Bowel regiment   - monitor UOP  Endo - glycemic control  Heme - DVT proph   ID - periop antibiotics.       Critical post op.    Critical care time spent 50 min

## 2018-04-24 NOTE — PHYSICAL THERAPY INITIAL EVALUATION ADULT - PERTINENT HX OF CURRENT PROBLEM, REHAB EVAL
62 year old male  presented to [facility] [place] 4/19 on referral of PCP for abnormal EKG w recent h/o chest pain. In the ED patient had an elevated troponin and was admitted to telemetry unit to r/o ACS.  Cardiac cath performed revealing significant left main disease with 85% occlusion.

## 2018-04-25 LAB
ANION GAP SERPL CALC-SCNC: 10 MMOL/L — SIGNIFICANT CHANGE UP (ref 5–17)
ANION GAP SERPL CALC-SCNC: 9 MMOL/L — SIGNIFICANT CHANGE UP (ref 5–17)
APTT BLD: 31 SEC — SIGNIFICANT CHANGE UP (ref 27.5–37.4)
APTT BLD: 39 SEC — HIGH (ref 27.5–37.4)
BUN SERPL-MCNC: 20 MG/DL — SIGNIFICANT CHANGE UP (ref 7–23)
BUN SERPL-MCNC: 22 MG/DL — SIGNIFICANT CHANGE UP (ref 7–23)
CALCIUM SERPL-MCNC: 8.1 MG/DL — LOW (ref 8.4–10.5)
CALCIUM SERPL-MCNC: 8.2 MG/DL — LOW (ref 8.4–10.5)
CHLORIDE SERPL-SCNC: 100 MMOL/L — SIGNIFICANT CHANGE UP (ref 96–108)
CHLORIDE SERPL-SCNC: 100 MMOL/L — SIGNIFICANT CHANGE UP (ref 96–108)
CO2 SERPL-SCNC: 24 MMOL/L — SIGNIFICANT CHANGE UP (ref 22–31)
CO2 SERPL-SCNC: 25 MMOL/L — SIGNIFICANT CHANGE UP (ref 22–31)
CREAT SERPL-MCNC: 1.12 MG/DL — SIGNIFICANT CHANGE UP (ref 0.5–1.3)
CREAT SERPL-MCNC: 1.13 MG/DL — SIGNIFICANT CHANGE UP (ref 0.5–1.3)
GLUCOSE BLDC GLUCOMTR-MCNC: 110 MG/DL — HIGH (ref 70–99)
GLUCOSE BLDC GLUCOMTR-MCNC: 138 MG/DL — HIGH (ref 70–99)
GLUCOSE BLDC GLUCOMTR-MCNC: 86 MG/DL — SIGNIFICANT CHANGE UP (ref 70–99)
GLUCOSE BLDC GLUCOMTR-MCNC: 92 MG/DL — SIGNIFICANT CHANGE UP (ref 70–99)
GLUCOSE SERPL-MCNC: 103 MG/DL — HIGH (ref 70–99)
GLUCOSE SERPL-MCNC: 123 MG/DL — HIGH (ref 70–99)
HCT VFR BLD CALC: 30.2 % — LOW (ref 39–50)
HCT VFR BLD CALC: 30.4 % — LOW (ref 39–50)
HGB BLD-MCNC: 9.8 G/DL — LOW (ref 13–17)
HGB BLD-MCNC: 9.9 G/DL — LOW (ref 13–17)
INR BLD: 1.26 — HIGH (ref 0.88–1.16)
INR BLD: 1.34 — HIGH (ref 0.88–1.16)
LACTATE SERPL-SCNC: 1.1 MMOL/L — SIGNIFICANT CHANGE UP (ref 0.5–2)
MAGNESIUM SERPL-MCNC: 2 MG/DL — SIGNIFICANT CHANGE UP (ref 1.6–2.6)
MAGNESIUM SERPL-MCNC: 2.2 MG/DL — SIGNIFICANT CHANGE UP (ref 1.6–2.6)
MCHC RBC-ENTMCNC: 29.8 PG — SIGNIFICANT CHANGE UP (ref 27–34)
MCHC RBC-ENTMCNC: 30 PG — SIGNIFICANT CHANGE UP (ref 27–34)
MCHC RBC-ENTMCNC: 32.2 G/DL — SIGNIFICANT CHANGE UP (ref 32–36)
MCHC RBC-ENTMCNC: 32.8 G/DL — SIGNIFICANT CHANGE UP (ref 32–36)
MCV RBC AUTO: 91 FL — SIGNIFICANT CHANGE UP (ref 80–100)
MCV RBC AUTO: 93 FL — SIGNIFICANT CHANGE UP (ref 80–100)
PHOSPHATE SERPL-MCNC: 2.1 MG/DL — LOW (ref 2.5–4.5)
PLATELET # BLD AUTO: 112 K/UL — LOW (ref 150–400)
PLATELET # BLD AUTO: 139 K/UL — LOW (ref 150–400)
POTASSIUM SERPL-MCNC: 4.1 MMOL/L — SIGNIFICANT CHANGE UP (ref 3.5–5.3)
POTASSIUM SERPL-MCNC: 4.6 MMOL/L — SIGNIFICANT CHANGE UP (ref 3.5–5.3)
POTASSIUM SERPL-SCNC: 4.1 MMOL/L — SIGNIFICANT CHANGE UP (ref 3.5–5.3)
POTASSIUM SERPL-SCNC: 4.6 MMOL/L — SIGNIFICANT CHANGE UP (ref 3.5–5.3)
PROTHROM AB SERPL-ACNC: 14 SEC — HIGH (ref 9.8–12.7)
PROTHROM AB SERPL-ACNC: 15 SEC — HIGH (ref 9.8–12.7)
RBC # BLD: 3.27 M/UL — LOW (ref 4.2–5.8)
RBC # BLD: 3.32 M/UL — LOW (ref 4.2–5.8)
RBC # FLD: 12.9 % — SIGNIFICANT CHANGE UP (ref 10.3–16.9)
RBC # FLD: 13.1 % — SIGNIFICANT CHANGE UP (ref 10.3–16.9)
SODIUM SERPL-SCNC: 133 MMOL/L — LOW (ref 135–145)
SODIUM SERPL-SCNC: 135 MMOL/L — SIGNIFICANT CHANGE UP (ref 135–145)
WBC # BLD: 6.9 K/UL — SIGNIFICANT CHANGE UP (ref 3.8–10.5)
WBC # BLD: 7 K/UL — SIGNIFICANT CHANGE UP (ref 3.8–10.5)
WBC # FLD AUTO: 6.9 K/UL — SIGNIFICANT CHANGE UP (ref 3.8–10.5)
WBC # FLD AUTO: 7 K/UL — SIGNIFICANT CHANGE UP (ref 3.8–10.5)

## 2018-04-25 PROCEDURE — 71045 X-RAY EXAM CHEST 1 VIEW: CPT | Mod: 26

## 2018-04-25 PROCEDURE — 99291 CRITICAL CARE FIRST HOUR: CPT

## 2018-04-25 RX ORDER — ASPIRIN/CALCIUM CARB/MAGNESIUM 324 MG
81 TABLET ORAL DAILY
Qty: 0 | Refills: 0 | Status: DISCONTINUED | OUTPATIENT
Start: 2018-04-25 | End: 2018-04-27

## 2018-04-25 RX ORDER — SODIUM CHLORIDE 9 MG/ML
3 INJECTION INTRAMUSCULAR; INTRAVENOUS; SUBCUTANEOUS EVERY 8 HOURS
Qty: 0 | Refills: 0 | Status: DISCONTINUED | OUTPATIENT
Start: 2018-04-25 | End: 2018-04-28

## 2018-04-25 RX ORDER — POTASSIUM PHOSPHATE, MONOBASIC POTASSIUM PHOSPHATE, DIBASIC 236; 224 MG/ML; MG/ML
15 INJECTION, SOLUTION INTRAVENOUS ONCE
Qty: 0 | Refills: 0 | Status: COMPLETED | OUTPATIENT
Start: 2018-04-25 | End: 2018-04-25

## 2018-04-25 RX ORDER — ALBUMIN HUMAN 25 %
250 VIAL (ML) INTRAVENOUS ONCE
Qty: 0 | Refills: 0 | Status: COMPLETED | OUTPATIENT
Start: 2018-04-25 | End: 2018-04-25

## 2018-04-25 RX ADMIN — HEPARIN SODIUM 5000 UNIT(S): 5000 INJECTION INTRAVENOUS; SUBCUTANEOUS at 13:38

## 2018-04-25 RX ADMIN — OXYCODONE AND ACETAMINOPHEN 1 TABLET(S): 5; 325 TABLET ORAL at 06:25

## 2018-04-25 RX ADMIN — Medication 25 MILLIGRAM(S): at 09:45

## 2018-04-25 RX ADMIN — HEPARIN SODIUM 5000 UNIT(S): 5000 INJECTION INTRAVENOUS; SUBCUTANEOUS at 05:39

## 2018-04-25 RX ADMIN — Medication 100 MILLIGRAM(S): at 05:39

## 2018-04-25 RX ADMIN — OXYCODONE AND ACETAMINOPHEN 2 TABLET(S): 5; 325 TABLET ORAL at 11:31

## 2018-04-25 RX ADMIN — Medication 333.33 MILLILITER(S): at 11:23

## 2018-04-25 RX ADMIN — SODIUM CHLORIDE 3 MILLILITER(S): 9 INJECTION INTRAMUSCULAR; INTRAVENOUS; SUBCUTANEOUS at 22:18

## 2018-04-25 RX ADMIN — OXYCODONE AND ACETAMINOPHEN 2 TABLET(S): 5; 325 TABLET ORAL at 19:19

## 2018-04-25 RX ADMIN — CLOPIDOGREL BISULFATE 75 MILLIGRAM(S): 75 TABLET, FILM COATED ORAL at 11:22

## 2018-04-25 RX ADMIN — POTASSIUM PHOSPHATE, MONOBASIC POTASSIUM PHOSPHATE, DIBASIC 62.5 MILLIMOLE(S): 236; 224 INJECTION, SOLUTION INTRAVENOUS at 05:39

## 2018-04-25 RX ADMIN — Medication 100 MILLIGRAM(S): at 00:37

## 2018-04-25 RX ADMIN — Medication 325 MILLIGRAM(S): at 11:22

## 2018-04-25 RX ADMIN — Medication 25 MILLIGRAM(S): at 00:37

## 2018-04-25 RX ADMIN — FAMOTIDINE 20 MILLIGRAM(S): 10 INJECTION INTRAVENOUS at 17:37

## 2018-04-25 RX ADMIN — Medication 1 TABLET(S): at 11:22

## 2018-04-25 RX ADMIN — OXYCODONE AND ACETAMINOPHEN 2 TABLET(S): 5; 325 TABLET ORAL at 17:35

## 2018-04-25 RX ADMIN — OXYCODONE AND ACETAMINOPHEN 2 TABLET(S): 5; 325 TABLET ORAL at 11:32

## 2018-04-25 RX ADMIN — HEPARIN SODIUM 5000 UNIT(S): 5000 INJECTION INTRAVENOUS; SUBCUTANEOUS at 22:19

## 2018-04-25 RX ADMIN — Medication 81 MILLIGRAM(S): at 17:37

## 2018-04-25 RX ADMIN — OXYCODONE AND ACETAMINOPHEN 1 TABLET(S): 5; 325 TABLET ORAL at 05:55

## 2018-04-25 RX ADMIN — SODIUM CHLORIDE 3 MILLILITER(S): 9 INJECTION INTRAMUSCULAR; INTRAVENOUS; SUBCUTANEOUS at 13:39

## 2018-04-25 RX ADMIN — SENNA PLUS 2 TABLET(S): 8.6 TABLET ORAL at 22:19

## 2018-04-25 RX ADMIN — LIDOCAINE 1 PATCH: 4 CREAM TOPICAL at 11:22

## 2018-04-25 RX ADMIN — Medication 15 MILLIGRAM(S): at 09:39

## 2018-04-25 RX ADMIN — ATORVASTATIN CALCIUM 40 MILLIGRAM(S): 80 TABLET, FILM COATED ORAL at 22:19

## 2018-04-25 RX ADMIN — Medication 100 MILLIGRAM(S): at 22:18

## 2018-04-25 RX ADMIN — Medication 25 MILLIGRAM(S): at 17:37

## 2018-04-25 RX ADMIN — Medication 100 MILLIGRAM(S): at 13:38

## 2018-04-25 RX ADMIN — FAMOTIDINE 20 MILLIGRAM(S): 10 INJECTION INTRAVENOUS at 05:39

## 2018-04-25 NOTE — PROGRESS NOTE ADULT - SUBJECTIVE AND OBJECTIVE BOX
CTICU  CRITICAL  CARE  attending     Hand off received 					   Pertinent clinical, laboratory, radiographic, hemodynamic, echocardiographic, respiratory data, microbiologic data and chart were reviewed and analyzed frequently throughout the course of the day and night  Patient seen and examined with CTS/ SH attending at bedside  Pt is a 62y , Male, HEALTH ISSUES - PROBLEM Dx:      , FAMILY HISTORY:  No pertinent family history in first degree relatives  PAST MEDICAL & SURGICAL HISTORY:  Hyperlipidemia, unspecified hyperlipidemia type  Gastroesophageal reflux disease, esophagitis presence not specified  Essential hypertension  H/O colonoscopy  History of esophagogastroduodenoscopy (EGD)  S/P tonsillectomy and adenoidectomy    Patient is a 62y old  Male who presents with a chief complaint of CAD (2018 21:18)      14 system review was unremarkable  acute changes include acute respiratory failure  Vital signs, hemodynamic and respiratory parameters were reviewed from the bedside nursing flowsheet.  ICU Vital Signs Last 24 Hrs  T(C): 38.2 (2018 14:32), Max: 38.2 (2018 14:32)  T(F): 100.8 (2018 14:32), Max: 100.8 (2018 14:32)  HR: 90 (2018 12:08) (68 - 90)  BP: 159/75 (2018 12:08) (118/60 - 159/75)  BP(mean): 107 (2018 12:08) (82 - 113)  ABP: --  ABP(mean): --  RR: 18 (2018 12:08) (16 - 19)  SpO2: 95% (2018 12:08) (94% - 99%)    Adult Advanced Hemodynamics Last 24 Hrs  CVP(mm Hg): --  CVP(cm H2O): --  CO: --  CI: --  PA: --  PA(mean): --  PCWP: --  SVR: --  SVRI: --  PVR: --  PVRI: --,     Intake and output was reviewed and the fluid balance was calculated  Daily     Daily Weight in k.3 (2018 17:29)  I&O's Summary    2018 07:01  -  2018 07:00  --------------------------------------------------------  IN: 552.5 mL / OUT: 2120 mL / NET: -1567.5 mL    2018 07:01  -  2018 15:32  --------------------------------------------------------  IN: 720 mL / OUT: 759 mL / NET: -39 mL        All lines and drain sites were assessed  [facility]      POCT Blood Glucose.: 125 mg/dL (2018 11:29)    No acute change in mental status  Auscultation of the chest reveals equal bs  Abdomen is soft  Extremities are warm and well perfused  Wounds appear clean and unremarkable  Antibiotics are periop    labs  CBC Full  -  ( 2018 11:40 )  WBC Count : 7.2 K/uL  Hemoglobin : 10.5 g/dL  Hematocrit : 33.4 %  Platelet Count - Automated : 166 K/uL  Mean Cell Volume : 92.3 fL  Mean Cell Hemoglobin : 29.0 pg  Mean Cell Hemoglobin Concentration : 31.4 g/dL  Auto Neutrophil # : x  Auto Lymphocyte # : x  Auto Monocyte # : x  Auto Eosinophil # : x  Auto Basophil # : x  Auto Neutrophil % : 69.3 %  Auto Lymphocyte % : 20.0 %  Auto Monocyte % : 8.3 %  Auto Eosinophil % : 2.3 %  Auto Basophil % : 0.1 %        136  |  100  |  18  ----------------------------<  91  5.1   |  25  |  0.96    Ca    9.0      2018 11:59  Phos  2.1     04-25  Mg     2.2     -      PT/INR - ( 2018 12:17 )   PT: 14.0 sec;   INR: 1.26          PTT - ( 2018 12:17 )  PTT:31.0 sec  The current medications were reviewed   MEDICATIONS  (STANDING):  aspirin enteric coated 81 milliGRAM(s) Oral daily  atorvastatin 40 milliGRAM(s) Oral at bedtime  clopidogrel Tablet 75 milliGRAM(s) Oral daily  dextrose 5%. 1000 milliLiter(s) (50 mL/Hr) IV Continuous <Continuous>  docusate sodium 100 milliGRAM(s) Oral three times a day  famotidine    Tablet 20 milliGRAM(s) Oral two times a day  heparin  Injectable 5000 Unit(s) SubCutaneous every 8 hours  insulin lispro (HumaLOG) corrective regimen sliding scale   SubCutaneous Before meals and at bedtime  lidocaine   Patch 1 Patch Transdermal daily  metoprolol tartrate 25 milliGRAM(s) Oral every 8 hours  multivitamin 1 Tablet(s) Oral daily  senna 2 Tablet(s) Oral at bedtime  sodium chloride 0.9% lock flush 3 milliLiter(s) IV Push every 8 hours    MEDICATIONS  (PRN):  acetaminophen   Tablet 650 milliGRAM(s) Oral every 6 hours PRN mild pain  dextrose Gel 1 Dose(s) Oral once PRN Blood Glucose LESS THAN 70 milliGRAM(s)/deciliter  glucagon  Injectable 1 milliGRAM(s) IntraMuscular once PRN Glucose LESS THAN 70 milligrams/deciliter  oxyCODONE    5 mG/acetaminophen 325 mG 2 Tablet(s) Oral every 6 hours PRN Severe Pain (7 - 10)  oxyCODONE    5 mG/acetaminophen 325 mG 1 Tablet(s) Oral every 6 hours PRN Moderate Pain (4 - 6)  polyethylene glycol 3350 17 Gram(s) Oral daily PRN Constipation       PROBLEM LIST/ ASSESSMENT:  HEALTH ISSUES - PROBLEM Dx:      ,   Patient is a 62y old  Male who presents with a chief complaint of CAD (2018 21:18)     s/p cardiac surgery      My plan includes :  close hemodynamic, ventilatory and drain monitoring and management per post op routine    Monitor for arrhythmias and monitor parameters for organ perfusion  monitor neurologic status  Head of the bed should remain elevated to 45 deg .   chest PT and IS will be encouraged  monitor adequacy of oxygenation and ventilation and attempt to wean oxygen  Nutritional goals will be met using po eventually , ensure adequate caloric intake and montior the same  Stress ulcer and VTE prophylaxis will be achieved    Glycemic control is satisfactory  Electrolytes have been repleted as necessary and wound care has been carried out. Pain control has been achieved.   agressive physical therapy and early mobility and ambulation goals will be met   The family was updated about the course and plan  CRITICAL CARE TIME SPENT in evaluation and management, reassessments, review and interpretation of labs and x-rays, ventilator and hemodynamic management, formulating a plan and coordinating care: ___90____ MIN.  Time does not include procedural time.  CTICU ATTENDING     					    Jamil Cuenca MD

## 2018-04-25 NOTE — CHART NOTE - NSCHARTNOTEFT_GEN_A_CORE
As per Dr. Solorio, mediastinal blakes removed at bedside this morning without complications.  U stitch tied down in place and occlusive dressing applied. Patient tolerated procedure well. Follow up CXR pending

## 2018-04-25 NOTE — PROGRESS NOTE ADULT - ASSESSMENT
Assessment: 61y/o male w PmHx HT, GERD, HLD, RBBB, admitted to [facility] [place] 4/19 for w/u chest pain. LC showed 85% LM disease, pt now s/p CABG X2 LIMA to LAD, SVG to OM on 4/23/18. Pt is ambulating and using his IS frequently. BB was started 4/24/18, and he was doing well. Transferred from  to  and, mediastinal gosia drains and webster removed.    Neurovascular: No delirium. Pain well controlled with current regimen.  - Tylenol, oxycodone and lidocaine patch PRN    Cardiovascular: Hemodynamically stable. HR controlled.  - Continue to monitor HR and BP  - Continue ASA, plavix, metoprolol tartate and lipitor     Respiratory: 02 Sat = 98% on RA.  -Encourage C+DB and Use of IS 10x / hr while awake.  -CXR PRN    GI: Stable.  -Continue pepcid and bowel regimen  -PO Diet.    Renal / :  -Monitor renal function. Cr1.13  -Monitor I/O's.  - F/u trial of void    Endocrine:    -A1c 4.18  -TSH 3.822  - D/C ISS    Hematologic:  -CBC, continue to monitor H/H = 10.5/33.4 --> 9.8/30.4    ID:  - Afebrile  - WBC 6.9  -Observe for SIRS/Sepsis Syndrome.    Prophylaxis:  -DVT prophylaxis with 5000 SubQ Heparin q8h.  -SCD's    Disposition:  Transferred from  to  4/25/18 Assessment: 61y/o male w PmHx HT, GERD, HLD, RBBB, admitted to [facility] [place] 4/19 for w/u chest pain. LHC showed 85% LM disease, pt now s/p CABG X2 LIMA to LAD, SVG to OM on 4/23/18. Post operative EF 60%.  Pt is ambulating and using his IS frequently. BB was started 4/24/18, and he was doing well. Transferred from  to  and, mediastinal gosia drains and webster removed.    Neurovascular: No delirium. Pain well controlled with current regimen.  - Tylenol, oxycodone and lidocaine patch PRN    Cardiovascular: Hemodynamically stable. HR controlled.  - Continue to monitor HR and BP    - Pt in NSR, v-tpw on backup rate 40 VVI  - Continue ASA, plavix, metoprolol tartate and lipitor     - Reduce ASA to 81mg daily as pt now on Plavix     - Metoprolol 25mg po q 8hrs   - Mediastinal Blakes removed 4/24    Respiratory: 02 Sat = 98% on RA.  - Pleural CTs remain,  to bilateral pleurovacs   - Check CXR in AM  - Encourage IS    GI: Stable.  -Continue Pepcid and bowel regimen  -Tolerating regular diet  -No BM since OR yet    Renal / :  - Continue Webster today for strict I/O  - Concern for hypovolemia this AM, Albumin 250mL x 1 given  - Repeat Labs/Lactic Acid    Endocrine:    -A1c 4.18  -TSH 3.822  -ISS    Hematologic:  -CBC, continue to monitor H/H = 10.5/33.4 --> 9.8/30.4    ID:  - Afebrile  - WBC 6.9  -Observe for SIRS/Sepsis Syndrome.    Prophylaxis:  -DVT prophylaxis with 5000 SubQ Heparin q8h.  -SCD's    Disposition:  Transferred from  to  4/25/18

## 2018-04-25 NOTE — PROGRESS NOTE ADULT - SUBJECTIVE AND OBJECTIVE BOX
Patient discussed on morning rounds with Dr. Solorio    Operation / Date: CABG LIMA to LAD, SVG to OM, 4/23/18    SUBJECTIVE ASSESSMENT:  62y Male POD #2 CABG to LIMA to LAD, SVG to OM. Examined in chair at bedside, pt states he slept well last night, has a good appetite and is feeling much better overall.     Vital Signs Last 24 Hrs  T(C): 36.9 (25 Apr 2018 10:40), Max: 37.7 (24 Apr 2018 14:05)  T(F): 98.4 (25 Apr 2018 10:40), Max: 99.9 (24 Apr 2018 14:05)  HR: 72 (25 Apr 2018 11:00) (72 - 86)  BP: 107/72 (25 Apr 2018 11:00) (100/59 - 140/77)  BP(mean): 87 (25 Apr 2018 11:00) (67 - 102)  RR: 23 (25 Apr 2018 11:00) (14 - 37)  SpO2: 98% (25 Apr 2018 11:00) (93% - 100%)  I&O's Detail    24 Apr 2018 07:01  -  25 Apr 2018 07:00  --------------------------------------------------------  IN:    IV PiggyBack: 400 mL    Oral Fluid: 960 mL    sodium chloride 0.9%: 240 mL    Solution: 187.5 mL  Total IN: 1787.5 mL    OUT:    Chest Tube: 460 mL    Drain: 70 mL    Indwelling Catheter - Urethral: 1894 mL  Total OUT: 2424 mL    Total NET: -636.5 mL      25 Apr 2018 07:01  -  25 Apr 2018 12:20  --------------------------------------------------------  IN:    Albumin 5%  - 250 mL: 250 mL    Oral Fluid: 240 mL    Solution: 62.5 mL  Total IN: 552.5 mL    OUT:    Chest Tube: 50 mL    Indwelling Catheter - Urethral: 250 mL  Total OUT: 300 mL    Total NET: 252.5 mL    CHEST TUBE: R and L pleural tubes  GOSIA DRAIN:  No  EPICARDIAL WIRES: V wires  TIE DOWNS: Yes  BUTT: No    PHYSICAL EXAM:    General: Seen sitting in chair, NAD    Neurological: AOx3, appropriate mood and affect    Cardiovascular: RRR, S1 S2, no m/g/r    Respiratory: CTA B/L, crackles at B/L lung bases    Gastrointestinal: ND, soft, NTTP    Extremities: well perfused, warm and dry, no calf tenderness, no lower extremity edema    Incision Sites: Sternotomy dressing CDI, 2 mediastinal gosia drains removed, vaseline dressing, CDI. 2 pleural chest tubes, drain sponge dressing, CDI. Left leg vein harvest sites uncovered, CDI, not erythematous or tender.    LABS:                        9.8    6.9   )-----------( 112      ( 25 Apr 2018 03:50 )             30.4       COUMADIN:  No    PT/INR - ( 25 Apr 2018 03:50 )   PT: 15.0 sec;   INR: 1.34          PTT - ( 25 Apr 2018 03:50 )  PTT:39.0 sec    04-25    133<L>  |  100  |  22  ----------------------------<  103<H>  4.1   |  24  |  1.13    Ca    8.2<L>      25 Apr 2018 03:50  Phos  2.1     04-25  Mg     2.2     04-25    TPro  6.6  /  Alb  3.4  /  TBili  0.6  /  DBili  x   /  AST  52<H>  /  ALT  20  /  AlkPhos  50  04-24    MEDICATIONS  (STANDING):  aspirin enteric coated 325 milliGRAM(s) Oral daily  atorvastatin 40 milliGRAM(s) Oral at bedtime  clopidogrel Tablet 75 milliGRAM(s) Oral daily  dextrose 5%. 1000 milliLiter(s) (50 mL/Hr) IV Continuous <Continuous>  docusate sodium 100 milliGRAM(s) Oral three times a day  famotidine    Tablet 20 milliGRAM(s) Oral two times a day  heparin  Injectable 5000 Unit(s) SubCutaneous every 8 hours  insulin lispro (HumaLOG) corrective regimen sliding scale   SubCutaneous Before meals and at bedtime  lidocaine   Patch 1 Patch Transdermal daily  metoprolol tartrate 25 milliGRAM(s) Oral every 8 hours  multivitamin 1 Tablet(s) Oral daily  senna 2 Tablet(s) Oral at bedtime  sodium chloride 0.9% lock flush 3 milliLiter(s) IV Push every 8 hours    MEDICATIONS  (PRN):  acetaminophen   Tablet 650 milliGRAM(s) Oral every 6 hours PRN mild pain  dextrose Gel 1 Dose(s) Oral once PRN Blood Glucose LESS THAN 70 milliGRAM(s)/deciliter  glucagon  Injectable 1 milliGRAM(s) IntraMuscular once PRN Glucose LESS THAN 70 milligrams/deciliter  oxyCODONE    5 mG/acetaminophen 325 mG 2 Tablet(s) Oral every 6 hours PRN Severe Pain (7 - 10)  oxyCODONE    5 mG/acetaminophen 325 mG 1 Tablet(s) Oral every 6 hours PRN Moderate Pain (4 - 6)  polyethylene glycol 3350 17 Gram(s) Oral daily PRN Constipation    RADIOLOGY & ADDITIONAL TESTS:

## 2018-04-25 NOTE — DIETITIAN INITIAL EVALUATION ADULT. - OTHER INFO
63y/o male w PmHx HT, GERD, HLD, RBBB,  admitted to [facility] [place] 4/19 for w/u chest pain. LC showed 85% LM disease, pt now s/p CABG X2. Pt tolerating diet with good PO intake. Denies any n/v/d, yet reports some constipation post op. Pt does not follow a heart healthy diet at home. Denies any wt changes. NKFA.

## 2018-04-25 NOTE — DIETITIAN INITIAL EVALUATION ADULT. - ENERGY NEEDS
Height: 68" Weight: 164lbs, IBW 154lbs+/-10%, %%, BMI - 25   ABW used to calculate energy needs due to pt's current body weight within % IBW   Nutrient needs based on [facility] standards of care for maintenance in adults, protein increased 2/2 surgery

## 2018-04-26 LAB
ANION GAP SERPL CALC-SCNC: 11 MMOL/L — SIGNIFICANT CHANGE UP (ref 5–17)
BASOPHILS NFR BLD AUTO: 0.1 % — SIGNIFICANT CHANGE UP (ref 0–2)
BUN SERPL-MCNC: 18 MG/DL — SIGNIFICANT CHANGE UP (ref 7–23)
CALCIUM SERPL-MCNC: 9 MG/DL — SIGNIFICANT CHANGE UP (ref 8.4–10.5)
CHLORIDE SERPL-SCNC: 100 MMOL/L — SIGNIFICANT CHANGE UP (ref 96–108)
CO2 SERPL-SCNC: 25 MMOL/L — SIGNIFICANT CHANGE UP (ref 22–31)
CREAT SERPL-MCNC: 0.96 MG/DL — SIGNIFICANT CHANGE UP (ref 0.5–1.3)
EOSINOPHIL NFR BLD AUTO: 2.3 % — SIGNIFICANT CHANGE UP (ref 0–6)
GLUCOSE BLDC GLUCOMTR-MCNC: 105 MG/DL — HIGH (ref 70–99)
GLUCOSE BLDC GLUCOMTR-MCNC: 121 MG/DL — HIGH (ref 70–99)
GLUCOSE BLDC GLUCOMTR-MCNC: 125 MG/DL — HIGH (ref 70–99)
GLUCOSE BLDC GLUCOMTR-MCNC: 86 MG/DL — SIGNIFICANT CHANGE UP (ref 70–99)
GLUCOSE SERPL-MCNC: 91 MG/DL — SIGNIFICANT CHANGE UP (ref 70–99)
HCT VFR BLD CALC: 33.4 % — LOW (ref 39–50)
HGB BLD-MCNC: 10.5 G/DL — LOW (ref 13–17)
LYMPHOCYTES # BLD AUTO: 20 % — SIGNIFICANT CHANGE UP (ref 13–44)
MAGNESIUM SERPL-MCNC: 2.2 MG/DL — SIGNIFICANT CHANGE UP (ref 1.6–2.6)
MCHC RBC-ENTMCNC: 29 PG — SIGNIFICANT CHANGE UP (ref 27–34)
MCHC RBC-ENTMCNC: 31.4 G/DL — LOW (ref 32–36)
MCV RBC AUTO: 92.3 FL — SIGNIFICANT CHANGE UP (ref 80–100)
MONOCYTES NFR BLD AUTO: 8.3 % — SIGNIFICANT CHANGE UP (ref 2–14)
NEUTROPHILS NFR BLD AUTO: 69.3 % — SIGNIFICANT CHANGE UP (ref 43–77)
PLATELET # BLD AUTO: 166 K/UL — SIGNIFICANT CHANGE UP (ref 150–400)
POTASSIUM SERPL-MCNC: 5.1 MMOL/L — SIGNIFICANT CHANGE UP (ref 3.5–5.3)
POTASSIUM SERPL-SCNC: 5.1 MMOL/L — SIGNIFICANT CHANGE UP (ref 3.5–5.3)
RBC # BLD: 3.62 M/UL — LOW (ref 4.2–5.8)
RBC # FLD: 12.9 % — SIGNIFICANT CHANGE UP (ref 10.3–16.9)
SODIUM SERPL-SCNC: 136 MMOL/L — SIGNIFICANT CHANGE UP (ref 135–145)
WBC # BLD: 7.2 K/UL — SIGNIFICANT CHANGE UP (ref 3.8–10.5)
WBC # FLD AUTO: 7.2 K/UL — SIGNIFICANT CHANGE UP (ref 3.8–10.5)

## 2018-04-26 PROCEDURE — 71045 X-RAY EXAM CHEST 1 VIEW: CPT | Mod: 26,76

## 2018-04-26 RX ADMIN — Medication 100 MILLIGRAM(S): at 21:15

## 2018-04-26 RX ADMIN — OXYCODONE AND ACETAMINOPHEN 2 TABLET(S): 5; 325 TABLET ORAL at 17:29

## 2018-04-26 RX ADMIN — Medication 25 MILLIGRAM(S): at 10:37

## 2018-04-26 RX ADMIN — ATORVASTATIN CALCIUM 40 MILLIGRAM(S): 80 TABLET, FILM COATED ORAL at 21:15

## 2018-04-26 RX ADMIN — Medication 100 MILLIGRAM(S): at 06:12

## 2018-04-26 RX ADMIN — SODIUM CHLORIDE 3 MILLILITER(S): 9 INJECTION INTRAMUSCULAR; INTRAVENOUS; SUBCUTANEOUS at 06:01

## 2018-04-26 RX ADMIN — FAMOTIDINE 20 MILLIGRAM(S): 10 INJECTION INTRAVENOUS at 17:34

## 2018-04-26 RX ADMIN — OXYCODONE AND ACETAMINOPHEN 2 TABLET(S): 5; 325 TABLET ORAL at 16:43

## 2018-04-26 RX ADMIN — FAMOTIDINE 20 MILLIGRAM(S): 10 INJECTION INTRAVENOUS at 06:12

## 2018-04-26 RX ADMIN — CLOPIDOGREL BISULFATE 75 MILLIGRAM(S): 75 TABLET, FILM COATED ORAL at 12:08

## 2018-04-26 RX ADMIN — HEPARIN SODIUM 5000 UNIT(S): 5000 INJECTION INTRAVENOUS; SUBCUTANEOUS at 06:12

## 2018-04-26 RX ADMIN — Medication 25 MILLIGRAM(S): at 23:57

## 2018-04-26 RX ADMIN — SODIUM CHLORIDE 3 MILLILITER(S): 9 INJECTION INTRAMUSCULAR; INTRAVENOUS; SUBCUTANEOUS at 21:15

## 2018-04-26 RX ADMIN — LIDOCAINE 1 PATCH: 4 CREAM TOPICAL at 12:08

## 2018-04-26 RX ADMIN — SODIUM CHLORIDE 3 MILLILITER(S): 9 INJECTION INTRAMUSCULAR; INTRAVENOUS; SUBCUTANEOUS at 14:19

## 2018-04-26 RX ADMIN — Medication 25 MILLIGRAM(S): at 16:44

## 2018-04-26 RX ADMIN — HEPARIN SODIUM 5000 UNIT(S): 5000 INJECTION INTRAVENOUS; SUBCUTANEOUS at 16:12

## 2018-04-26 RX ADMIN — Medication 100 MILLIGRAM(S): at 16:13

## 2018-04-26 RX ADMIN — Medication 25 MILLIGRAM(S): at 00:15

## 2018-04-26 RX ADMIN — LIDOCAINE 1 PATCH: 4 CREAM TOPICAL at 23:56

## 2018-04-26 RX ADMIN — Medication 1 TABLET(S): at 12:08

## 2018-04-26 RX ADMIN — LIDOCAINE 1 PATCH: 4 CREAM TOPICAL at 00:15

## 2018-04-26 RX ADMIN — HEPARIN SODIUM 5000 UNIT(S): 5000 INJECTION INTRAVENOUS; SUBCUTANEOUS at 21:15

## 2018-04-26 RX ADMIN — Medication 81 MILLIGRAM(S): at 12:08

## 2018-04-26 NOTE — PROGRESS NOTE ADULT - ASSESSMENT
61y/o male, PMHx HTN, GERD, HLD, RBBB, admitted to [facility] [place] 4/19 for chest pain. LHC showed 85% LM disease. On 4/23/18 patient underwent a CABG X2 LIMA to LAD, SVG to OM. Post operative EF 60%. BB was started on POD 1, he tolerated it well. POD#2, transferred from 9E to 9L and, mediastinal gosia drains and webster removed. POD 3, right and left pleural drains removed, CXR stable. Central line and webster out. Ambulating well on room air.    Neuro: pain control  -tylenol, percocet, lidoderm    CV: HLD, HTN, RBBB, CAD s/p CABG  -ASA, Plavix, statin, lopressor  -titrate lopressor as tolerated  -monitor tele, HR and BP    Pulm: no active issues  -Rt and left pleural tubes removed, cxr stable  -SATing % on room air  -encouraged ambulation 3x's today and IS 10x/hr    GI: Had BM today  -Diet: DASH diet  -GI ppx: pepcid  -Bowel reg; colace, senna, miralax prn    Renal/: Cr. 0.96 from 1.12 (downtrending)  -monitor lytes, replete prn  -strict IOs    Endo: HgA1c 4.18, TSH 3.822  -ISS corrective reg  -monitor finger sticks    Heme: H/H 10.5/33.4 (uptrending)  -DVT ppx: subcut heparin, SCDs    ID: no active issues  -afebrile, WBC 7.2  -monitor temp and CBC    Dispo: Home tomorrow if remains medically ready

## 2018-04-26 NOTE — PROGRESS NOTE ADULT - SUBJECTIVE AND OBJECTIVE BOX
Patient discussed on morning rounds with Dr. Solorio  Operation / Date: 4/23 CABG    SUBJECTIVE ASSESSMENT:  62y Male, seen at the bedside this morning. He is complaining of incisional pain and that he hasn't yet had a BM since surgery, but feels like he has to go. He denies fever, chills, headache, dizziness, chest pain, palpitations, SOB, abdominal pain, n/v/d, pain or swelling in the upper or lower extremities.      Vital Signs Last 24 Hrs  T(C): 37.4 (26 Apr 2018 16:59), Max: 38.2 (26 Apr 2018 14:32)  T(F): 99.4 (26 Apr 2018 16:59), Max: 100.8 (26 Apr 2018 14:32)  HR: 88 (26 Apr 2018 16:30) (68 - 90)  BP: 166/74 (26 Apr 2018 16:30) (118/60 - 166/74)  BP(mean): 106 (26 Apr 2018 16:30) (82 - 113)  RR: 16 (26 Apr 2018 16:30) (16 - 19)  SpO2: 100% (26 Apr 2018 16:30) (94% - 100%)    I&O's Detail  25 Apr 2018 07:01  -  26 Apr 2018 07:00  --------------------------------------------------------  IN:    Albumin 5%  - 250 mL: 250 mL    Oral Fluid: 240 mL    Solution: 62.5 mL  Total IN: 552.5 mL  OUT:    Chest Tube: 130 mL    Chest Tube: 65 mL    Chest Tube: 50 mL    Indwelling Catheter - Urethral: 1875 mL  Total OUT: 2120 mL  Total NET: -1567.5 mL  26 Apr 2018 07:01  -  26 Apr 2018 17:10  --------------------------------------------------------  IN:    Oral Fluid: 720 mL  Total IN: 720 mL  OUT:    Chest Tube: 13 mL    Chest Tube: 21 mL    Indwelling Catheter - Urethral: 325 mL    Voided: 400 mL  Total OUT: 759 mL  Total NET: -39 mL    CHEST TUBE:  No.  GURMEET DRAIN:  No.  EPICARDIAL WIRES: Yes.  TIE DOWNS: Yes.  BUTT: No.    PHYSICAL EXAM:  General: NAD, sitting comfortably in his bed  Neurological: A&Ox3, no focal deficits  Cardiovascular: S1S2, RRR, no m/g/r  Respiratory: Rhonchi at b/l bases, otherwise CTA. Normal inspiratory effort  Gastrointestinal: +BS. NT/ND  Extremities: b/l 1+ edema. No calf tenderness  Vascular: 1+ peripheral pulses, warm and well perfused  Incision Sites: MSI: c/d/i, no sternal click. Gurmeet sites: c/d/i/ Left EVH: Minimal erythema, no swelling or drainage.    LABS:                        10.5   7.2   )-----------( 166      ( 26 Apr 2018 11:40 )             33.4     COUMADIN:  No.   PT/INR - ( 25 Apr 2018 12:17 )   PT: 14.0 sec;   INR: 1.26     PTT - ( 25 Apr 2018 12:17 )  PTT:31.0 sec    04-26    136  |  100  |  18  ----------------------------<  91  5.1   |  25  |  0.96    Ca    9.0      26 Apr 2018 11:59  Phos  2.1     04-25  Mg     2.2     04-26        MEDICATIONS  (STANDING):  aspirin enteric coated 81 milliGRAM(s) Oral daily  atorvastatin 40 milliGRAM(s) Oral at bedtime  clopidogrel Tablet 75 milliGRAM(s) Oral daily  dextrose 5%. 1000 milliLiter(s) (50 mL/Hr) IV Continuous <Continuous>  docusate sodium 100 milliGRAM(s) Oral three times a day  famotidine    Tablet 20 milliGRAM(s) Oral two times a day  heparin  Injectable 5000 Unit(s) SubCutaneous every 8 hours  insulin lispro (HumaLOG) corrective regimen sliding scale   SubCutaneous Before meals and at bedtime  lidocaine   Patch 1 Patch Transdermal daily  metoprolol tartrate 25 milliGRAM(s) Oral every 8 hours  multivitamin 1 Tablet(s) Oral daily  senna 2 Tablet(s) Oral at bedtime  sodium chloride 0.9% lock flush 3 milliLiter(s) IV Push every 8 hours    MEDICATIONS  (PRN):  acetaminophen   Tablet 650 milliGRAM(s) Oral every 6 hours PRN mild pain  dextrose Gel 1 Dose(s) Oral once PRN Blood Glucose LESS THAN 70 milliGRAM(s)/deciliter  glucagon  Injectable 1 milliGRAM(s) IntraMuscular once PRN Glucose LESS THAN 70 milligrams/deciliter  oxyCODONE    5 mG/acetaminophen 325 mG 2 Tablet(s) Oral every 6 hours PRN Severe Pain (7 - 10)  oxyCODONE    5 mG/acetaminophen 325 mG 1 Tablet(s) Oral every 6 hours PRN Moderate Pain (4 - 6)  polyethylene glycol 3350 17 Gram(s) Oral daily PRN Constipation      RADIOLOGY & ADDITIONAL TESTS:  Xray Chest 1 View-PORTABLE IMMEDIATE (04.25.18 @ 11:23)  Findings: Again status post thoracic surgery. Mediastinal drain removed.   Bilateral chest tubes and right central line remain in place. No   pneumothorax. Improvement bibasilar atelectasis and small bilateral   pleural effusions.  Impression: Improvement bibasilar atelectasis and small bilateral pleural   effusions.

## 2018-04-27 ENCOUNTER — TRANSCRIPTION ENCOUNTER (OUTPATIENT)
Age: 63
End: 2018-04-27

## 2018-04-27 LAB
GLUCOSE BLDC GLUCOMTR-MCNC: 81 MG/DL — SIGNIFICANT CHANGE UP (ref 70–99)
GLUCOSE BLDC GLUCOMTR-MCNC: 87 MG/DL — SIGNIFICANT CHANGE UP (ref 70–99)

## 2018-04-27 PROCEDURE — 71045 X-RAY EXAM CHEST 1 VIEW: CPT | Mod: 26

## 2018-04-27 RX ORDER — ASPIRIN/CALCIUM CARB/MAGNESIUM 324 MG
325 TABLET ORAL DAILY
Qty: 0 | Refills: 0 | Status: DISCONTINUED | OUTPATIENT
Start: 2018-04-27 | End: 2018-04-28

## 2018-04-27 RX ADMIN — Medication 100 MILLIGRAM(S): at 05:07

## 2018-04-27 RX ADMIN — SODIUM CHLORIDE 3 MILLILITER(S): 9 INJECTION INTRAMUSCULAR; INTRAVENOUS; SUBCUTANEOUS at 22:15

## 2018-04-27 RX ADMIN — SODIUM CHLORIDE 3 MILLILITER(S): 9 INJECTION INTRAMUSCULAR; INTRAVENOUS; SUBCUTANEOUS at 14:00

## 2018-04-27 RX ADMIN — SENNA PLUS 2 TABLET(S): 8.6 TABLET ORAL at 22:26

## 2018-04-27 RX ADMIN — SODIUM CHLORIDE 3 MILLILITER(S): 9 INJECTION INTRAMUSCULAR; INTRAVENOUS; SUBCUTANEOUS at 05:07

## 2018-04-27 RX ADMIN — HEPARIN SODIUM 5000 UNIT(S): 5000 INJECTION INTRAVENOUS; SUBCUTANEOUS at 22:26

## 2018-04-27 RX ADMIN — Medication 1 TABLET(S): at 13:09

## 2018-04-27 RX ADMIN — ATORVASTATIN CALCIUM 40 MILLIGRAM(S): 80 TABLET, FILM COATED ORAL at 22:27

## 2018-04-27 RX ADMIN — Medication 25 MILLIGRAM(S): at 07:59

## 2018-04-27 RX ADMIN — Medication 100 MILLIGRAM(S): at 22:26

## 2018-04-27 RX ADMIN — FAMOTIDINE 20 MILLIGRAM(S): 10 INJECTION INTRAVENOUS at 05:07

## 2018-04-27 RX ADMIN — Medication 25 MILLIGRAM(S): at 18:25

## 2018-04-27 RX ADMIN — FAMOTIDINE 20 MILLIGRAM(S): 10 INJECTION INTRAVENOUS at 18:25

## 2018-04-27 RX ADMIN — LIDOCAINE 1 PATCH: 4 CREAM TOPICAL at 13:09

## 2018-04-27 RX ADMIN — LIDOCAINE 1 PATCH: 4 CREAM TOPICAL at 22:14

## 2018-04-27 RX ADMIN — Medication 100 MILLIGRAM(S): at 13:09

## 2018-04-27 RX ADMIN — HEPARIN SODIUM 5000 UNIT(S): 5000 INJECTION INTRAVENOUS; SUBCUTANEOUS at 13:22

## 2018-04-27 RX ADMIN — CLOPIDOGREL BISULFATE 75 MILLIGRAM(S): 75 TABLET, FILM COATED ORAL at 13:09

## 2018-04-27 RX ADMIN — Medication 325 MILLIGRAM(S): at 13:22

## 2018-04-27 RX ADMIN — HEPARIN SODIUM 5000 UNIT(S): 5000 INJECTION INTRAVENOUS; SUBCUTANEOUS at 05:07

## 2018-04-27 NOTE — DISCHARGE NOTE ADULT - CARE PROVIDERS DIRECT ADDRESSES
[email or web address]

## 2018-04-27 NOTE — DISCHARGE NOTE ADULT - MEDICATION SUMMARY - MEDICATIONS TO STOP TAKING
I will STOP taking the medications listed below when I get home from the hospital:    lisinopril 20 mg oral tablet  -- 1 tab(s) by mouth once a day    Toprol-XL 25 mg oral tablet, extended release  -- 1 tab(s) by mouth once a day

## 2018-04-27 NOTE — PROGRESS NOTE ADULT - SUBJECTIVE AND OBJECTIVE BOX
Patient discussed on morning rounds with Dr. Solorio  Operation / Date: 4/23 CABG    SUBJECTIVE ASSESSMENT:  62y Male, seen at the bedside this morning. He is without complaints. He had a BM this morning and is ambulating well on room air. He is pulling 1000 on IS. Denies fever, chills, headache, dizziness, chest pain, palpitations, SOB, abdominal pain, n/v/d, pain or swelling in the upper or lower extremities.    Vital Signs Last 24 Hrs  T(C): 36.7 (27 Apr 2018 14:27), Max: 37.4 (26 Apr 2018 16:59)  T(F): 98 (27 Apr 2018 14:27), Max: 99.4 (26 Apr 2018 16:59)  HR: 94 (27 Apr 2018 13:25) (80 - 94)  BP: 138/64 (27 Apr 2018 13:25) (100/57 - 166/74)  BP(mean): 91 (27 Apr 2018 13:25) (74 - 106)  RR: 16 (27 Apr 2018 13:25) (16 - 16)  SpO2: 97% (27 Apr 2018 13:25) (96% - 100%)    I&O's Detail  26 Apr 2018 07:01  -  27 Apr 2018 07:00  --------------------------------------------------------  IN:    Oral Fluid: 1200 mL  Total IN: 1200 mL  OUT:    Chest Tube: 13 mL    Chest Tube: 21 mL    Indwelling Catheter - Urethral: 325 mL    Voided: 1850 mL  Total OUT: 2209 mL  Total NET: -1009 mL  27 Apr 2018 07:01  -  27 Apr 2018 16:04  --------------------------------------------------------  IN:    Oral Fluid: 980 mL  Total IN: 980 mL  OUT:    Voided: 1400 mL  Total OUT: 1400 mL  Total NET: -420 mL      CHEST TUBE:  No.  GURMEET DRAIN:  No.  EPICARDIAL WIRES: No.  TIE DOWNS: Yes  BUTT: No.    PHYSICAL EXAM:  General: NAD, lying comfortably in bed  Neurological: A&Ox3, no focal deficits  Cardiovascular: S1S2, RRR, no m/g/r  Respiratory: CTA b/l. No wheezing/rhonchi/rales  Gastrointestinal: +BS. NT/ND  Extremities: 1+ peripheral edema. No calf tenderness  Vascular: 2+ peripheral pulses  Incision Sites: MSI: c/d/i, no sternal click. Gurmeet sites: c/d/i/, occlusive dressing changed. Left EVH: Minimal erythema, no swelling or drainage.    LABS:                        10.5   7.2   )-----------( 166      ( 26 Apr 2018 11:40 )             33.4       COUMADIN:  No.     04-26    136  |  100  |  18  ----------------------------<  91  5.1   |  25  |  0.96    Ca    9.0      26 Apr 2018 11:59  Mg     2.2     04-26      MEDICATIONS  (STANDING):  aspirin enteric coated 325 milliGRAM(s) Oral daily  atorvastatin 40 milliGRAM(s) Oral at bedtime  clopidogrel Tablet 75 milliGRAM(s) Oral daily  dextrose 5%. 1000 milliLiter(s) (50 mL/Hr) IV Continuous <Continuous>  docusate sodium 100 milliGRAM(s) Oral three times a day  famotidine    Tablet 20 milliGRAM(s) Oral two times a day  heparin  Injectable 5000 Unit(s) SubCutaneous every 8 hours  insulin lispro (HumaLOG) corrective regimen sliding scale   SubCutaneous Before meals and at bedtime  lidocaine   Patch 1 Patch Transdermal daily  metoprolol tartrate 25 milliGRAM(s) Oral every 8 hours  multivitamin 1 Tablet(s) Oral daily  senna 2 Tablet(s) Oral at bedtime  sodium chloride 0.9% lock flush 3 milliLiter(s) IV Push every 8 hours    MEDICATIONS  (PRN):  acetaminophen   Tablet 650 milliGRAM(s) Oral every 6 hours PRN mild pain  dextrose Gel 1 Dose(s) Oral once PRN Blood Glucose LESS THAN 70 milliGRAM(s)/deciliter  glucagon  Injectable 1 milliGRAM(s) IntraMuscular once PRN Glucose LESS THAN 70 milligrams/deciliter  oxyCODONE    5 mG/acetaminophen 325 mG 2 Tablet(s) Oral every 6 hours PRN Severe Pain (7 - 10)  oxyCODONE    5 mG/acetaminophen 325 mG 1 Tablet(s) Oral every 6 hours PRN Moderate Pain (4 - 6)  polyethylene glycol 3350 17 Gram(s) Oral daily PRN Constipation      RADIOLOGY & ADDITIONAL TESTS:   Xray Chest 1 View- PORTABLE-Routine (04.27.18 @ 07:34)   FINDINGS: there are persistent bibasilar atelectatic changes with trace bilateral   pleural effusions. No new parenchymal abnormality in the mid and upper   lung zones. No pneumothorax. Median sternotomy wires remain unchanged in   alignment. Cardiomediastinal silhouette is also grossly unchanged in   appearance.   IMPRESSION:  Stable abnormal chest radiograph.

## 2018-04-27 NOTE — DISCHARGE NOTE ADULT - HOSPITAL COURSE
61y/o male, PMHx HTN, GERD, HLD, RBBB, [place] [facility] [place]. A left heart catheterization showed 85% Left main disease. On 4/23/18 patient underwent an uncomplicated CABG X2 LIMA to LAD, SVG to OM., post operative EF 60%. He was transferred to the CTICU and was extubated on POD 0. On POD 1, his beta blocker was restarted and he tolerated it well. POD#2, patient was de-lined and transferred from 9E to 9, mediastinal gosia drains and webster removed. Patient passed TOV. POD 3, right and left pleural drains removed, CXR stable. Right pleural incision secured with tie down and left was secured with an occlusive dressing. Central line removed. POD 4, pacing wires cut. Patient is now POD 5, has remained HD stable and is ambulating well on room air. He climbed stairs with physical therapy without difficulty. As per Dr. Solorio, he is now medically ready to be discharged home and will follow up in the office in 1 week.

## 2018-04-27 NOTE — DISCHARGE NOTE ADULT - CARE PROVIDER_API CALL
Mark Solorio), Cardiovascular Surgery  [address]  [address] [place] [address]  Phone: [phone]  Fax: [phone]    Jos Lewis; PhD), Cardiovascular Disease; Interventional Cardiology; Nuclear Cardiology  [address] [address]  Phone: [phone]  Fax: [phone]

## 2018-04-27 NOTE — DISCHARGE NOTE ADULT - CARE PLAN
Principal Discharge DX:	CAD (coronary artery disease)  Goal:	To recover from surgery  Assessment and plan of treatment:	-Please follow up with Dr. Solorio in 1 week.  The office is located at [facility], [facility], 4th floor. Please call us on Monday 4/30 for an appointment at [phone]. Our office is aware to call you to schedule an appointment as well.  -Please follow up with Dr. Jos Lewis (your cardiologist) in 2-3 weeks. Please call his office at [phone] to schedule an appointment.    -Walk daily as tolerated and use your incentive spirometer every hour.    -No driving or strenuous activity/exercise for 6 weeks, or until cleared by your surgeon.    -Gently clean your incisions with anti-bacterial soap and water, pat dry.  You may leave them open to air.    -Call your doctor if you have shortness of breath, chest pain not relieved by pain medication, dizziness, fever >101.5, or increased redness or drainage from incisions.

## 2018-04-27 NOTE — DISCHARGE NOTE ADULT - MEDICATION SUMMARY - MEDICATIONS TO TAKE
I will START or STAY ON the medications listed below when I get home from the hospital:    Aspir 81 oral delayed release tablet  -- 1 tab(s) by mouth once a day  -- Indication: For Antiplatelet    acetaminophen 325 mg oral tablet  -- 2 tab(s) by mouth every 6 hours, As needed, mild pain  -- Indication: For Mild Pain    oxyCODONE-acetaminophen 5 mg-325 mg oral tablet  -- 1 tab(s) by mouth every 6 hours, As needed, Moderate Pain (4 - 6) MDD:4 tabs  -- Indication: For Moderate Pain    Lipitor 40 mg oral tablet  -- 1 tab(s) by mouth once a day  -- Indication: For High Cholesterol    clopidogrel 75 mg oral tablet  -- 1 tab(s) by mouth once a day  -- Indication: For Antiplatelet    metoprolol tartrate 50 mg oral tablet  -- 1 tab(s) by mouth every 12 hours   -- It is very important that you take or use this exactly as directed.  Do not skip doses or discontinue unless directed by your doctor.  May cause drowsiness.  Alcohol may intensify this effect.  Use care when operating dangerous machinery.  Some non-prescription drugs may aggravate your condition.  Read all labels carefully.  If a warning appears, check with your doctor before taking.  Take with food or milk.  This drug may impair the ability to drive or operate machinery.  Use care until you become familiar with its effects.    -- Indication: For High blood pressure    famotidine 20 mg oral tablet  -- 1 tab(s) by mouth 2 times a day  -- Indication: For Stomach ulcer prevention    docusate sodium 100 mg oral capsule  -- 1 cap(s) by mouth 3 times a day  -- Indication: For Stool softener, stop for loose stool    senna oral tablet  -- 2 tab(s) by mouth once a day (at bedtime)  -- Indication: For Stool softener, stop for loose stool    Centrum Silver Men's oral tablet  -- 1 tab(s) by mouth once a day  -- Indication: For Vitamins

## 2018-04-27 NOTE — DISCHARGE NOTE ADULT - PLAN OF CARE
To recover from surgery -Please follow up with Dr. Solorio in 1 week.  The office is located at [facility], [facility], 4th floor. Please call us on Monday 4/30 for an appointment at [phone]. Our office is aware to call you to schedule an appointment as well.  -Please follow up with Dr. Jos Lewis (your cardiologist) in 2-3 weeks. Please call his office at [phone] to schedule an appointment.    -Walk daily as tolerated and use your incentive spirometer every hour.    -No driving or strenuous activity/exercise for 6 weeks, or until cleared by your surgeon.    -Gently clean your incisions with anti-bacterial soap and water, pat dry.  You may leave them open to air.    -Call your doctor if you have shortness of breath, chest pain not relieved by pain medication, dizziness, fever >101.5, or increased redness or drainage from incisions.

## 2018-04-27 NOTE — DISCHARGE NOTE ADULT - PATIENT PORTAL LINK FT
You can access the [facility] Patient Portal, offered by [facility], by registering with the following website: [email or web address]

## 2018-04-28 VITALS — TEMPERATURE: 98 F

## 2018-04-28 LAB
ANION GAP SERPL CALC-SCNC: 12 MMOL/L — SIGNIFICANT CHANGE UP (ref 5–17)
BUN SERPL-MCNC: 18 MG/DL — SIGNIFICANT CHANGE UP (ref 7–23)
CALCIUM SERPL-MCNC: 9 MG/DL — SIGNIFICANT CHANGE UP (ref 8.4–10.5)
CHLORIDE SERPL-SCNC: 103 MMOL/L — SIGNIFICANT CHANGE UP (ref 96–108)
CO2 SERPL-SCNC: 25 MMOL/L — SIGNIFICANT CHANGE UP (ref 22–31)
CREAT SERPL-MCNC: 1.03 MG/DL — SIGNIFICANT CHANGE UP (ref 0.5–1.3)
GLUCOSE SERPL-MCNC: 107 MG/DL — HIGH (ref 70–99)
HCT VFR BLD CALC: 32 % — LOW (ref 39–50)
HGB BLD-MCNC: 10.5 G/DL — LOW (ref 13–17)
MAGNESIUM SERPL-MCNC: 2.1 MG/DL — SIGNIFICANT CHANGE UP (ref 1.6–2.6)
MCHC RBC-ENTMCNC: 29.8 PG — SIGNIFICANT CHANGE UP (ref 27–34)
MCHC RBC-ENTMCNC: 32.8 G/DL — SIGNIFICANT CHANGE UP (ref 32–36)
MCV RBC AUTO: 90.9 FL — SIGNIFICANT CHANGE UP (ref 80–100)
PLATELET # BLD AUTO: 208 K/UL — SIGNIFICANT CHANGE UP (ref 150–400)
POTASSIUM SERPL-MCNC: 4.6 MMOL/L — SIGNIFICANT CHANGE UP (ref 3.5–5.3)
POTASSIUM SERPL-SCNC: 4.6 MMOL/L — SIGNIFICANT CHANGE UP (ref 3.5–5.3)
RBC # BLD: 3.52 M/UL — LOW (ref 4.2–5.8)
RBC # FLD: 12.8 % — SIGNIFICANT CHANGE UP (ref 10.3–16.9)
SODIUM SERPL-SCNC: 140 MMOL/L — SIGNIFICANT CHANGE UP (ref 135–145)
WBC # BLD: 6.3 K/UL — SIGNIFICANT CHANGE UP (ref 3.8–10.5)
WBC # FLD AUTO: 6.3 K/UL — SIGNIFICANT CHANGE UP (ref 3.8–10.5)

## 2018-04-28 PROCEDURE — 71045 X-RAY EXAM CHEST 1 VIEW: CPT | Mod: 26

## 2018-04-28 RX ORDER — ATORVASTATIN CALCIUM 80 MG/1
1 TABLET, FILM COATED ORAL
Qty: 30 | Refills: 0 | OUTPATIENT
Start: 2018-04-28 | End: 2018-05-27

## 2018-04-28 RX ORDER — METOPROLOL TARTRATE 50 MG
1 TABLET ORAL
Qty: 60 | Refills: 0 | OUTPATIENT
Start: 2018-04-28 | End: 2018-05-27

## 2018-04-28 RX ORDER — CLOPIDOGREL BISULFATE 75 MG/1
1 TABLET, FILM COATED ORAL
Qty: 30 | Refills: 0 | OUTPATIENT
Start: 2018-04-28 | End: 2018-05-27

## 2018-04-28 RX ORDER — METOPROLOL TARTRATE 50 MG
25 TABLET ORAL EVERY 6 HOURS
Qty: 0 | Refills: 0 | Status: DISCONTINUED | OUTPATIENT
Start: 2018-04-28 | End: 2018-04-28

## 2018-04-28 RX ORDER — ASPIRIN/CALCIUM CARB/MAGNESIUM 324 MG
1 TABLET ORAL
Qty: 30 | Refills: 0 | OUTPATIENT
Start: 2018-04-28 | End: 2018-05-27

## 2018-04-28 RX ORDER — LISINOPRIL 2.5 MG/1
1 TABLET ORAL
Qty: 0 | Refills: 0 | COMMUNITY

## 2018-04-28 RX ORDER — ATORVASTATIN CALCIUM 80 MG/1
1 TABLET, FILM COATED ORAL
Qty: 0 | Refills: 0 | COMMUNITY

## 2018-04-28 RX ORDER — DOCUSATE SODIUM 100 MG
1 CAPSULE ORAL
Qty: 90 | Refills: 0 | OUTPATIENT
Start: 2018-04-28 | End: 2018-05-27

## 2018-04-28 RX ORDER — ACETAMINOPHEN 500 MG
2 TABLET ORAL
Qty: 240 | Refills: 0 | OUTPATIENT
Start: 2018-04-28 | End: 2018-05-27

## 2018-04-28 RX ORDER — FAMOTIDINE 10 MG/ML
1 INJECTION INTRAVENOUS
Qty: 60 | Refills: 0 | OUTPATIENT
Start: 2018-04-28 | End: 2018-05-27

## 2018-04-28 RX ORDER — SENNA PLUS 8.6 MG/1
2 TABLET ORAL
Qty: 60 | Refills: 0 | OUTPATIENT
Start: 2018-04-28 | End: 2018-05-27

## 2018-04-28 RX ORDER — METOPROLOL TARTRATE 50 MG
1 TABLET ORAL
Qty: 0 | Refills: 0 | COMMUNITY

## 2018-04-28 RX ORDER — ASPIRIN/CALCIUM CARB/MAGNESIUM 324 MG
1 TABLET ORAL
Qty: 0 | Refills: 0 | COMMUNITY

## 2018-04-28 RX ADMIN — Medication 25 MILLIGRAM(S): at 09:01

## 2018-04-28 RX ADMIN — Medication 25 MILLIGRAM(S): at 00:42

## 2018-04-28 RX ADMIN — Medication 1 TABLET(S): at 09:02

## 2018-04-28 RX ADMIN — Medication 100 MILLIGRAM(S): at 07:05

## 2018-04-28 RX ADMIN — Medication 325 MILLIGRAM(S): at 09:02

## 2018-04-28 RX ADMIN — SODIUM CHLORIDE 3 MILLILITER(S): 9 INJECTION INTRAMUSCULAR; INTRAVENOUS; SUBCUTANEOUS at 06:31

## 2018-04-28 RX ADMIN — CLOPIDOGREL BISULFATE 75 MILLIGRAM(S): 75 TABLET, FILM COATED ORAL at 09:02

## 2018-04-28 RX ADMIN — FAMOTIDINE 20 MILLIGRAM(S): 10 INJECTION INTRAVENOUS at 07:05

## 2018-04-28 RX ADMIN — HEPARIN SODIUM 5000 UNIT(S): 5000 INJECTION INTRAVENOUS; SUBCUTANEOUS at 07:05

## 2018-04-28 NOTE — PROGRESS NOTE ADULT - ASSESSMENT
-Please follow up with Dr. Solorio in 1 week.  The office is located at [facility], [facility], 4th floor. Please call us on Monday 4/30 for an appointment at [phone]. Our office is aware to call you to schedule an appointment as well.    -Please follow up with Dr. Jos Lewis (your cardiologist) in 2-3 weeks. Please call his office at [phone] to schedule an appointment.

## 2018-04-28 NOTE — PROGRESS NOTE ADULT - PROVIDER SPECIALTY LIST ADULT
CT Surgery
Critical Care
CT Surgery

## 2018-04-28 NOTE — PROGRESS NOTE ADULT - SUBJECTIVE AND OBJECTIVE BOX
Patient discussed on morning rounds with Dr. Solorio  Operation / Date:  CABG x 2      4/23/18    SUBJECTIVE ASSESSMENT:  62y Male, with no acute events overnight, seen at the bedside. He is feeling well and is excited to be discharged home. He denies fever, chills, headache, dizziness, chest pain, palpitations, SOB, abdominal pain, n/v/d, pain or swelling in the upper or lower extremities.    Vital Signs Last 24 Hrs  T(C): 36.8 (28 Apr 2018 09:26), Max: 37.4 (28 Apr 2018 01:00)  T(F): 98.2 (28 Apr 2018 09:26), Max: 99.4 (28 Apr 2018 01:00)  HR: 96 (28 Apr 2018 09:00) (76 - 98)  BP: 153/66 (28 Apr 2018 09:00) (114/63 - 167/76)  BP(mean): 95 (28 Apr 2018 09:00) (80 - 118)  RR: 16 (28 Apr 2018 09:00) (12 - 16)  SpO2: 98% (28 Apr 2018 09:00) (96% - 98%)    I&O's Detail  27 Apr 2018 07:01  -  28 Apr 2018 07:00  --------------------------------------------------------  IN:    Oral Fluid: 2030 mL  Total IN: 2030 mL  OUT:    Voided: 3200 mL  Total OUT: 3200 mL  Total NET: -1170 mL  28 Apr 2018 07:01  -  28 Apr 2018 11:48  --------------------------------------------------------  IN:  Total IN: 0 mL  OUT:    Voided: 400 mL  Total OUT: 400 mL  Total NET: -400 mL      CHEST TUBE:  No.    GURMEET DRAIN:  No.  EPICARDIAL WIRES: No.  TIE DOWNS: Yes.  BUTT: No.    PHYSICAL EXAM:  General: NAD, sitting comfortably in bed  Neurological: A&Ox3, no focal deficits  Cardiovascular: S1S2, RRR, no m/g/r  Respiratory: CTA b/l, no wheezing/rhonchi/rales  Gastrointestinal: +BS. NT/ND  Extremities: No edema or calf tenderness  Vascular: 2+ peripheral pulses  Incision Sites: LT EVH: c/d/i, no swelling or erythema. MSI: no sternal click, c/d/i, no erythema/swelling/drainage. Gurmeet sites: with tie downs and occlusive dressing.    LABS:                        10.5   6.3   )-----------( 208      ( 28 Apr 2018 06:32 )             32.0       COUMADIN:  No.         04-28    140  |  103  |  18  ----------------------------<  107<H>  4.6   |  25  |  1.03    Ca    9.0      28 Apr 2018 06:32  Mg     2.1     04-28            MEDICATIONS  (STANDING):  aspirin enteric coated 325 milliGRAM(s) Oral daily  atorvastatin 40 milliGRAM(s) Oral at bedtime  clopidogrel Tablet 75 milliGRAM(s) Oral daily  docusate sodium 100 milliGRAM(s) Oral three times a day  famotidine    Tablet 20 milliGRAM(s) Oral two times a day  heparin  Injectable 5000 Unit(s) SubCutaneous every 8 hours  lidocaine   Patch 1 Patch Transdermal daily  metoprolol tartrate 25 milliGRAM(s) Oral every 6 hours  multivitamin 1 Tablet(s) Oral daily  senna 2 Tablet(s) Oral at bedtime  sodium chloride 0.9% lock flush 3 milliLiter(s) IV Push every 8 hours    MEDICATIONS  (PRN):  acetaminophen   Tablet 650 milliGRAM(s) Oral every 6 hours PRN mild pain  oxyCODONE    5 mG/acetaminophen 325 mG 2 Tablet(s) Oral every 6 hours PRN Severe Pain (7 - 10)  oxyCODONE    5 mG/acetaminophen 325 mG 1 Tablet(s) Oral every 6 hours PRN Moderate Pain (4 - 6)  polyethylene glycol 3350 17 Gram(s) Oral daily PRN Constipation    RADIOLOGY & ADDITIONAL TESTS:   Xray Chest 1 View- PORTABLE-Routine (04.28.18 @ 07:16)  Portable examination the chest demonstrates patient status post   sternotomy. Mild cardiomegaly. Bilateral effusions. Chronic interstitial   changes. Impression: Bilateral effusions

## 2018-04-30 ENCOUNTER — APPOINTMENT (OUTPATIENT)
Dept: CARDIOTHORACIC SURGERY | Facility: CLINIC | Age: 63
End: 2018-04-30
Payer: MEDICAID

## 2018-04-30 VITALS — OXYGEN SATURATION: 98 % | TEMPERATURE: 101 F

## 2018-04-30 VITALS
RESPIRATION RATE: 19 BRPM | WEIGHT: 162 LBS | TEMPERATURE: 99.8 F | DIASTOLIC BLOOD PRESSURE: 66 MMHG | BODY MASS INDEX: 24.84 KG/M2 | SYSTOLIC BLOOD PRESSURE: 128 MMHG | OXYGEN SATURATION: 94 % | HEIGHT: 67.72 IN | HEART RATE: 81 BPM

## 2018-04-30 DIAGNOSIS — Z87.19 PERSONAL HISTORY OF OTHER DISEASES OF THE DIGESTIVE SYSTEM: ICD-10-CM

## 2018-04-30 DIAGNOSIS — R50.9 FEVER, UNSPECIFIED: ICD-10-CM

## 2018-04-30 DIAGNOSIS — Z78.9 OTHER SPECIFIED HEALTH STATUS: ICD-10-CM

## 2018-04-30 DIAGNOSIS — Z87.891 PERSONAL HISTORY OF NICOTINE DEPENDENCE: ICD-10-CM

## 2018-04-30 PROCEDURE — 99024 POSTOP FOLLOW-UP VISIT: CPT

## 2018-04-30 RX ORDER — IBUPROFEN 600 MG/1
600 TABLET, FILM COATED ORAL 3 TIMES DAILY
Qty: 90 | Refills: 0 | Status: ACTIVE | COMMUNITY
Start: 2018-04-30 | End: 1900-01-01

## 2018-04-30 RX ORDER — FAMOTIDINE 20 MG/1
20 TABLET, FILM COATED ORAL TWICE DAILY
Qty: 60 | Refills: 0 | Status: ACTIVE | COMMUNITY

## 2018-04-30 RX ORDER — OXYCODONE HYDROCHLORIDE AND ACETAMINOPHEN 5; 325 MG/1; MG/1
5-325 TABLET ORAL
Refills: 0 | Status: ACTIVE | COMMUNITY

## 2018-04-30 RX ORDER — MULTIVIT-MIN/FA/LYCOPEN/LUTEIN .4-300-25
TABLET ORAL DAILY
Refills: 0 | Status: ACTIVE | COMMUNITY

## 2018-05-02 ENCOUNTER — APPOINTMENT (OUTPATIENT)
Dept: CARDIOTHORACIC SURGERY | Facility: CLINIC | Age: 63
End: 2018-05-02

## 2018-05-04 ENCOUNTER — APPOINTMENT (OUTPATIENT)
Dept: CARDIOTHORACIC SURGERY | Facility: CLINIC | Age: 63
End: 2018-05-04
Payer: MEDICAID

## 2018-05-04 VITALS
BODY MASS INDEX: 23.61 KG/M2 | OXYGEN SATURATION: 95 % | SYSTOLIC BLOOD PRESSURE: 142 MMHG | DIASTOLIC BLOOD PRESSURE: 74 MMHG | RESPIRATION RATE: 19 BRPM | WEIGHT: 154 LBS | HEART RATE: 84 BPM | TEMPERATURE: 97.2 F

## 2018-05-04 PROCEDURE — 99024 POSTOP FOLLOW-UP VISIT: CPT

## 2018-05-23 PROCEDURE — 93880 EXTRACRANIAL BILAT STUDY: CPT

## 2018-05-23 PROCEDURE — 81001 URINALYSIS AUTO W/SCOPE: CPT

## 2018-05-23 PROCEDURE — 85027 COMPLETE CBC AUTOMATED: CPT

## 2018-05-23 PROCEDURE — 82962 GLUCOSE BLOOD TEST: CPT

## 2018-05-23 PROCEDURE — 80053 COMPREHEN METABOLIC PANEL: CPT

## 2018-05-23 PROCEDURE — 84443 ASSAY THYROID STIM HORMONE: CPT

## 2018-05-23 PROCEDURE — 84100 ASSAY OF PHOSPHORUS: CPT

## 2018-05-23 PROCEDURE — 71045 X-RAY EXAM CHEST 1 VIEW: CPT

## 2018-05-23 PROCEDURE — 93005 ELECTROCARDIOGRAM TRACING: CPT

## 2018-05-23 PROCEDURE — 84484 ASSAY OF TROPONIN QUANT: CPT

## 2018-05-23 PROCEDURE — 82330 ASSAY OF CALCIUM: CPT

## 2018-05-23 PROCEDURE — 84295 ASSAY OF SERUM SODIUM: CPT

## 2018-05-23 PROCEDURE — 84132 ASSAY OF SERUM POTASSIUM: CPT

## 2018-05-23 PROCEDURE — P9016: CPT

## 2018-05-23 PROCEDURE — 83605 ASSAY OF LACTIC ACID: CPT

## 2018-05-23 PROCEDURE — 94150 VITAL CAPACITY TEST: CPT

## 2018-05-23 PROCEDURE — 86901 BLOOD TYPING SEROLOGIC RH(D): CPT

## 2018-05-23 PROCEDURE — 82803 BLOOD GASES ANY COMBINATION: CPT

## 2018-05-23 PROCEDURE — 83880 ASSAY OF NATRIURETIC PEPTIDE: CPT

## 2018-05-23 PROCEDURE — P9045: CPT

## 2018-05-23 PROCEDURE — 97161 PT EVAL LOW COMPLEX 20 MIN: CPT

## 2018-05-23 PROCEDURE — 82550 ASSAY OF CK (CPK): CPT

## 2018-05-23 PROCEDURE — 85610 PROTHROMBIN TIME: CPT

## 2018-05-23 PROCEDURE — 83735 ASSAY OF MAGNESIUM: CPT

## 2018-05-23 PROCEDURE — 36415 COLL VENOUS BLD VENIPUNCTURE: CPT

## 2018-05-23 PROCEDURE — 83036 HEMOGLOBIN GLYCOSYLATED A1C: CPT

## 2018-05-23 PROCEDURE — 97116 GAIT TRAINING THERAPY: CPT

## 2018-05-23 PROCEDURE — 85025 COMPLETE CBC W/AUTO DIFF WBC: CPT

## 2018-05-23 PROCEDURE — 80061 LIPID PANEL: CPT

## 2018-05-23 PROCEDURE — 80048 BASIC METABOLIC PNL TOTAL CA: CPT

## 2018-05-23 PROCEDURE — C1889: CPT

## 2018-05-23 PROCEDURE — 93306 TTE W/DOPPLER COMPLETE: CPT

## 2018-05-23 PROCEDURE — 82553 CREATINE MB FRACTION: CPT

## 2018-05-23 PROCEDURE — 71046 X-RAY EXAM CHEST 2 VIEWS: CPT

## 2018-05-23 PROCEDURE — 85730 THROMBOPLASTIN TIME PARTIAL: CPT

## 2018-05-23 PROCEDURE — 86850 RBC ANTIBODY SCREEN: CPT

## 2018-05-23 PROCEDURE — 36430 TRANSFUSION BLD/BLD COMPNT: CPT

## 2018-05-23 PROCEDURE — 86923 COMPATIBILITY TEST ELECTRIC: CPT

## 2018-05-23 PROCEDURE — 86900 BLOOD TYPING SEROLOGIC ABO: CPT

## 2018-05-24 ENCOUNTER — APPOINTMENT (OUTPATIENT)
Dept: HEART AND VASCULAR | Facility: CLINIC | Age: 63
End: 2018-05-24
Payer: MEDICAID

## 2018-05-24 VITALS — SYSTOLIC BLOOD PRESSURE: 149 MMHG | HEART RATE: 62 BPM | DIASTOLIC BLOOD PRESSURE: 80 MMHG

## 2018-05-24 DIAGNOSIS — I45.10 UNSPECIFIED RIGHT BUNDLE-BRANCH BLOCK: ICD-10-CM

## 2018-05-24 PROCEDURE — 99204 OFFICE O/P NEW MOD 45 MIN: CPT

## 2018-05-24 RX ORDER — CLOPIDOGREL BISULFATE 75 MG/1
75 TABLET, FILM COATED ORAL DAILY
Qty: 30 | Refills: 2 | Status: ACTIVE | COMMUNITY
Start: 1900-01-01 | End: 1900-01-01

## 2018-05-25 ENCOUNTER — APPOINTMENT (OUTPATIENT)
Dept: CARDIOTHORACIC SURGERY | Facility: CLINIC | Age: 63
End: 2018-05-25

## 2018-05-25 VITALS
HEART RATE: 68 BPM | BODY MASS INDEX: 23.31 KG/M2 | WEIGHT: 152 LBS | RESPIRATION RATE: 18 BRPM | SYSTOLIC BLOOD PRESSURE: 128 MMHG | DIASTOLIC BLOOD PRESSURE: 64 MMHG | OXYGEN SATURATION: 100 % | TEMPERATURE: 97.9 F

## 2018-05-25 DIAGNOSIS — Z09 ENCOUNTER FOR FOLLOW-UP EXAMINATION AFTER COMPLETED TREATMENT FOR CONDITIONS OTHER THAN MALIGNANT NEOPLASM: ICD-10-CM

## 2018-05-25 DIAGNOSIS — Z48.89 ENCOUNTER FOR OTHER SPECIFIED SURGICAL AFTERCARE: ICD-10-CM

## 2018-05-25 RX ORDER — AMOXICILLIN AND CLAVULANATE POTASSIUM 875; 125 MG/1; MG/1
875-125 TABLET, COATED ORAL
Qty: 20 | Refills: 0 | Status: COMPLETED | COMMUNITY
Start: 2018-04-30 | End: 2018-05-25

## 2018-05-25 RX ORDER — SENNOSIDES 8.6 MG TABLETS 8.6 MG/1
8.6 TABLET ORAL
Refills: 0 | Status: COMPLETED | COMMUNITY
End: 2018-05-25

## 2018-05-25 RX ORDER — POTASSIUM CHLORIDE 1500 MG/1
20 TABLET, EXTENDED RELEASE ORAL
Qty: 60 | Refills: 0 | Status: COMPLETED | COMMUNITY
Start: 2018-04-30 | End: 2018-05-25

## 2018-05-25 RX ORDER — DOCUSATE SODIUM 100 MG/1
100 CAPSULE ORAL 3 TIMES DAILY
Qty: 90 | Refills: 0 | Status: COMPLETED | COMMUNITY
End: 2018-05-25

## 2018-05-25 RX ORDER — FUROSEMIDE 20 MG/1
20 TABLET ORAL
Qty: 90 | Refills: 0 | Status: COMPLETED | COMMUNITY
Start: 2018-04-30 | End: 2018-05-25

## 2018-06-08 RX ORDER — METOPROLOL TARTRATE 25 MG/1
25 TABLET, FILM COATED ORAL
Qty: 120 | Refills: 3 | Status: ACTIVE | COMMUNITY
Start: 1900-01-01 | End: 1900-01-01

## 2018-06-08 RX ORDER — ATORVASTATIN CALCIUM 40 MG/1
40 TABLET, FILM COATED ORAL
Qty: 60 | Refills: 3 | Status: ACTIVE | COMMUNITY
Start: 1900-01-01 | End: 1900-01-01

## 2018-09-13 ENCOUNTER — APPOINTMENT (OUTPATIENT)
Dept: HEART AND VASCULAR | Facility: CLINIC | Age: 63
End: 2018-09-13
Payer: MEDICAID

## 2018-09-13 VITALS — HEART RATE: 54 BPM | DIASTOLIC BLOOD PRESSURE: 94 MMHG | SYSTOLIC BLOOD PRESSURE: 181 MMHG | OXYGEN SATURATION: 98 %

## 2018-09-13 DIAGNOSIS — I25.811 ATHEROSCLEROSIS OF NATIVE CORONARY ARTERY OF TRANSPLANTED HEART W/OUT ANGINA PECTORIS: ICD-10-CM

## 2018-09-13 DIAGNOSIS — I10 ESSENTIAL (PRIMARY) HYPERTENSION: ICD-10-CM

## 2018-09-13 DIAGNOSIS — E78.5 HYPERLIPIDEMIA, UNSPECIFIED: ICD-10-CM

## 2018-09-13 DIAGNOSIS — Z95.1 PRESENCE OF AORTOCORONARY BYPASS GRAFT: ICD-10-CM

## 2018-09-13 PROBLEM — K21.9 GASTRO-ESOPHAGEAL REFLUX DISEASE WITHOUT ESOPHAGITIS: Chronic | Status: ACTIVE | Noted: 2018-04-20

## 2018-09-13 PROCEDURE — 99214 OFFICE O/P EST MOD 30 MIN: CPT

## 2018-09-13 RX ORDER — LISINOPRIL 20 MG/1
20 TABLET ORAL DAILY
Qty: 90 | Refills: 3 | Status: ACTIVE | COMMUNITY
Start: 2018-09-13 | End: 1900-01-01

## 2018-09-20 PROBLEM — I25.811 CORONARY ATHEROSCLEROSIS OF NATIVE ARTERY OF TRANSPLANTED HEART: Status: ACTIVE | Noted: 2018-05-24

## 2018-09-20 PROBLEM — I10 HTN (HYPERTENSION): Status: ACTIVE | Noted: 2018-04-30

## 2018-09-20 PROBLEM — E78.5 HLD (HYPERLIPIDEMIA): Status: ACTIVE | Noted: 2018-04-30

## 2018-09-20 PROBLEM — Z95.1 S/P CABG X 2: Status: ACTIVE | Noted: 2018-04-30

## 2019-03-14 ENCOUNTER — APPOINTMENT (OUTPATIENT)
Dept: HEART AND VASCULAR | Facility: CLINIC | Age: 64
End: 2019-03-14

## 2021-12-23 NOTE — PROGRESS NOTE ADULT - ASSESSMENT
63y/o male, PMHx HTN, GERD, HLD, RBBB, admitted to [facility] [place] 4/19 for chest pain. LHC showed 85% LM disease. On 4/23/18 patient underwent a CABG X2 LIMA to LAD, SVG to OM. Post operative EF 60%. BB was started on POD 1, he tolerated it well. POD#2, transferred from 9E to 9L and, mediastinal gosia drains and webster removed. POD 3, right and left pleural drains removed, CXR stable. Central line and webster out. Ambulating well on room air. POD 4, pacing wires cut. HD stable, doing well.    Neuro: pain control  -tylenol, percocet, lidoderm    CV: HLD, HTN, RBBB, CAD s/p CABG  -ASA, Plavix, statin, lopressor  -titrate lopressor as tolerated  -monitor tele, HR and BP    Pulm: no active issues  -Rt and left pleural tubes removed, cxr stable  -SATing % on room air  -encouraged ambulation 3x's today and IS 10x/hr    GI: Had BM today  -Diet: DASH diet  -GI ppx: pepcid  -Bowel reg; colace, senna, miralax prn    Renal/: Cr. 0.96 from 1.12 (downtrending)  -monitor lytes, replete prn  -strict IOs    Endo: HgA1c 4.18, TSH 3.822  -ISS corrective reg  -monitor finger sticks    Heme: H/H 10.5/33.4 (uptrending)  -DVT ppx: subcut heparin, SCDs    ID: no active issues  -afebrile, WBC 7.2  -monitor temp and CBC    Dispo: Home tomorrow if remains medically ready 63y/o male, PMHx HTN, GERD, HLD, RBBB, admitted to [facility] [place] for chest pain. LHC showed 85% LM disease. On 4/23/18 patient underwent a CABG X2 LIMA to LAD, SVG to OM. Post operative EF 60%. BB was started on POD 1, he tolerated it well. POD#2, transferred from 9E to 9L and, mediastinal gosia drains and webster removed. POD 3, right and left pleural drains removed, CXR stable. Central line and webster out. Ambulating well on room air. POD 4, pacing wires cut. HD stable, doing well.    Neuro: pain control  -tylenol, percocet, lidoderm    CV: HLD, HTN, RBBB, CAD s/p CABG  -ASA, Plavix, statin, lopressor  -titrate lopressor as tolerated  -monitor tele, HR and BP    Pulm: no active issues  -SATing 97% on room air  -encouraged ambulation 3x's today and IS 10x/hr    GI: Having BMs  -Diet: DASH diet  -GI ppx: pepcid  -Bowel reg; colace, senna, miralax prn    Renal/: Cr. stable  -monitor lytes, replete prn  -strict IOs    Endo: HgA1c 4.18, TSH 3.822  -ISS corrective reg  -monitor finger sticks    Heme: stable  -H/H 10.5/33.4  -DVT ppx: subcut heparin, SCDs    ID: no active issues  -afebrile  -monitor temp and CBC    Dispo: Home tomorrow with family Yes

## 2024-09-18 NOTE — PATIENT PROFILE ADULT. - NS PRO OT REFERRAL QUES 1 YN
Detail Level: Detailed Detail Level: Zone Apixaban/Eliquis is used to treat and prevent blood clots. If you are not able to swallow the tablets whole, they may be crushed and mixed in water, apple juice, or applesauce and promptly taken within four hours. Never skip a dose of Apixaban/Eliquis. If you forget to take your Apixaban/Eliquis, take a dose as soon as you remember. If it is almost time for your next Apixaban/Eliquis dose, wait until then and take a regular dose. DO NOT take an extra pill to ‘catch up’.  NEVER TAKE A DOUBLE DOSE. Notify your doctor that you missed a dose. Take Apixaban/Eliquis at the same time each morning and evening. Apixaban/Eliquis may be taken with other medication or food. Prescription Strength Graduated Compression Stockings Recommendations: The patient was counseled that prescription strength graduated compression stockings should be worn for all waking hours. They will follow up with a venous specialist to monitor graduated compression stocking usage and their symptoms. no